# Patient Record
Sex: MALE | Race: BLACK OR AFRICAN AMERICAN | NOT HISPANIC OR LATINO | Employment: STUDENT | URBAN - METROPOLITAN AREA
[De-identification: names, ages, dates, MRNs, and addresses within clinical notes are randomized per-mention and may not be internally consistent; named-entity substitution may affect disease eponyms.]

---
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
## 2022-06-24 ENCOUNTER — APPOINTMENT (OUTPATIENT)
Dept: LAB | Facility: AMBULARY SURGERY CENTER | Age: 22
End: 2022-06-24

## 2022-06-24 DIAGNOSIS — Z11.1 SCREENING EXAMINATION FOR PULMONARY TUBERCULOSIS: ICD-10-CM

## 2022-06-24 PROCEDURE — 36415 COLL VENOUS BLD VENIPUNCTURE: CPT

## 2022-06-24 PROCEDURE — 86480 TB TEST CELL IMMUN MEASURE: CPT

## 2022-06-26 LAB
GAMMA INTERFERON BACKGROUND BLD IA-ACNC: 0.03 IU/ML
M TB IFN-G BLD-IMP: NEGATIVE
M TB IFN-G CD4+ BCKGRND COR BLD-ACNC: -0.01 IU/ML
M TB IFN-G CD4+ BCKGRND COR BLD-ACNC: 0.02 IU/ML
MITOGEN IGNF BCKGRD COR BLD-ACNC: 9.49 IU/ML

## 2022-11-21 ENCOUNTER — EVALUATION (OUTPATIENT)
Dept: PHYSICAL THERAPY | Facility: OTHER | Age: 22
End: 2022-11-21

## 2022-11-21 DIAGNOSIS — S86.012D RUPTURE OF LEFT ACHILLES TENDON, SUBSEQUENT ENCOUNTER: Primary | ICD-10-CM

## 2022-11-21 NOTE — PROGRESS NOTES
PT Evaluation     Today's date: 2022  Patient name: Alexander Mcneil  : 2000  MRN: [id number]  Referring provider: No ref  provider found  Dx: No diagnosis found  Assessment  Assessment details: Alexander Mcneil is a pleasant 23 y o  male who presents with L achllies repair 10/26/22 he was injuired playing basketball  He is 50% WB today in CAM boot  He removed his wedge  PT was attended at school for several visits  Today progressing several hip and knee exercises  Plan to see clint when over break this winter  insion is healing well and pain is controlled well  HEP issued and POC reviewed  Impairments: abnormal coordination, abnormal muscle firing, abnormal or restricted ROM, activity intolerance, impaired physical strength, lacks appropriate home exercise program, pain with function and poor body mechanics    Symptom irritability: moderateUnderstanding of Dx/Px/POC: good   Prognosis: good    Goals  Short Term:  Pt will report decreased levels of pain by at least 2 subjective ratings in 4 weeks  Pt will demonstrate improved ROM by at least 10 degrees in 4 weeks  Pt will demonstrate improved strength by 1/2 grade  Long Term:   Pt will be independent in their HEP in 8 weeks  Pt will be be pain free with IADL's  Pt will demonstrate improved FOTO, > 80         Plan  Plan details: Prognosis above is given PT services 2x/week tapering to 1x/week over the next 3 months and home program adherence    Patient would benefit from: skilled physical therapy  Planned modality interventions: thermotherapy: hydrocollator packs  Planned therapy interventions: activity modification, joint mobilization, manual therapy, motor coordination training, neuromuscular re-education, patient education, self care, therapeutic activities, therapeutic exercise, graded activity and home exercise program  Frequency: 2x week  Treatment plan discussed with: patient        Subjective Evaluation    Pain  At best pain ratin  At worst pain ratin    Patient Goals  Patient goals for therapy: decreased pain, independence with ADLs/IADLs, return to sport/leisure activities, increased motion and increased strength          Objective     General Comments: Ankle/Foot Comments   PF 45 in 15 evr 5     MMT not tested/       difficulty with intric mms activation of the foot  Precautions:       Manuals             L anklePF  /inv/evr stretching no DF                                                     Neuro Re-Ed             Toe curls                Weight shifts              minisqaut             Leg press              Clam shell              bridges                           Ther Ex             Bike              WS                                                                                            Ther Activity                                       Gait Training                                       Modalities

## 2022-11-23 ENCOUNTER — OFFICE VISIT (OUTPATIENT)
Dept: PHYSICAL THERAPY | Facility: OTHER | Age: 22
End: 2022-11-23

## 2022-11-23 DIAGNOSIS — S86.012D RUPTURE OF LEFT ACHILLES TENDON, SUBSEQUENT ENCOUNTER: Primary | ICD-10-CM

## 2022-11-25 ENCOUNTER — OFFICE VISIT (OUTPATIENT)
Dept: PHYSICAL THERAPY | Facility: OTHER | Age: 22
End: 2022-11-25

## 2022-11-25 DIAGNOSIS — S86.012D RUPTURE OF LEFT ACHILLES TENDON, SUBSEQUENT ENCOUNTER: Primary | ICD-10-CM

## 2022-11-25 NOTE — PROGRESS NOTES
Daily Note     Today's date: 2022  Patient name: Xenia Adler  : 2000  MRN: [id number]  Referring provider: Thuy Cornejo  Dx:   Encounter Diagnosis     ICD-10-CM    1  Rupture of left Achilles tendon, subsequent encounter  S86 012D                      Subjective: reviewed the next phase fo the protocol     Objective: See treatment diary below      Assessment: Tolerated treatment well  Patient demonstrated fatigue post treatment      Plan: Continue per plan of care  Precautions:       Manuals             L anklePF  /inv/evr stretching no DF  MJ                                                    Neuro Re-Ed             Toe curls    30x             Weight shifts  2min             minisqaut 10x3 boot on                                                                 Leg press  #70 boot on             Clam shell  rtb             bridges  15x2                          Ther Ex             Bike  5min             WS              DF ROM  5''x10             Ankle pumps              Wobble board  20 each             Heel slide sitting                                        Ther Activity                                       Gait Training                                       Modalities

## 2022-11-25 NOTE — PROGRESS NOTES
Daily Note     Today's date: 2022  Patient name: Yuniel Tinajero  : 2000  MRN: [id number]  Referring provider: Peng Cartagena  Dx:   Encounter Diagnosis     ICD-10-CM    1  Rupture of left Achilles tendon, subsequent encounter  S86 012D                      Subjective: reviewed the next phase fo the protocol     Objective: See treatment diary below      Assessment: Tolerated treatment well  Patient demonstrated fatigue post treatment      Plan: Continue per plan of care  Precautions:       Manuals             L anklePF  /inv/evr stretching no DF  MJ                                                    Neuro Re-Ed             Toe curls    30x             Weight shifts  2min  2MIN            minisqaut 10x3 boot on  10X3                                                               Leg press  #70 boot on             Clam shell  rtb             bridges  15x2  15x2            LAQ   gtb 15x2            Ther Ex             Bike  5min  5MIN            WS              DF ROM  5''x10  5''X10            Ankle pumps              Wobble board  20 each  20EA            Heel slide sitting                                        Ther Activity                                       Gait Training                                       Modalities

## 2022-12-29 ENCOUNTER — APPOINTMENT (OUTPATIENT)
Dept: PHYSICAL THERAPY | Facility: OTHER | Age: 22
End: 2022-12-29

## 2022-12-30 ENCOUNTER — EVALUATION (OUTPATIENT)
Dept: PHYSICAL THERAPY | Facility: OTHER | Age: 22
End: 2022-12-30

## 2022-12-30 DIAGNOSIS — S86.012D RUPTURE OF LEFT ACHILLES TENDON, SUBSEQUENT ENCOUNTER: Primary | ICD-10-CM

## 2022-12-30 NOTE — PROGRESS NOTES
Daily Note     Today's date: 2022  Patient name: Frederick Casarez  : 2000  MRN: [id number]  Referring provider: Gopal Preciado  Dx:   No diagnosis found  Subjective: reviewed the next phase fo the protocol increased swelling since he got out of the boot about 1 5 weeks ago  He is not using the heel lift  Mild pain with light IADL;s      Objective: See treatment diary below      Assessment: Tolerated treatment well  Patient demonstrated fatigue post treatment      Plan: Continue per plan of care  Precautions:       Manuals  12 30           All planes MJ  MJ                                                  Neuro Re-Ed             Toe curls    30x             Weight shifts  2min  2MIN            minisqaut 10x3 boot on  10X3           Rocker board  Ml 20ea            Marching              Step ups   6in            HR leg press  #50 10x3            biodex balance              Leg press  #70 boot on  #90            Clam shell  rtb             bridges  15x2  15x2            LAQ   gtb 15x2            Ther Ex             Bike  5min  5MIN            WS              DF ROM  5''x10  5''X10  Towel            Ankle pumps              Wobble board  20 each  20EA            Heel slide sitting   NV                                      Ther Activity                                       Gait Training                                       Modalities

## 2023-01-03 ENCOUNTER — OFFICE VISIT (OUTPATIENT)
Dept: PHYSICAL THERAPY | Facility: OTHER | Age: 23
End: 2023-01-03

## 2023-01-03 DIAGNOSIS — S86.012D RUPTURE OF LEFT ACHILLES TENDON, SUBSEQUENT ENCOUNTER: Primary | ICD-10-CM

## 2023-01-03 NOTE — PROGRESS NOTES
Daily Note     Today's date: 1/3/2023  Patient name: Radha Adams  : 2000  MRN: [id number]  Referring provider: Robert Chin  Dx:   Encounter Diagnosis     ICD-10-CM    1  Rupture of left Achilles tendon, subsequent encounter  S86 012D           Start Time: 1445  Stop Time: 1545  Total time in clinic (min): 60 minutes    Subjective: less swellng and pain not quite ready for ISTM secondary to small scab on the incision  Objective: See treatment diary below      Assessment: Tolerated treatment well  Patient demonstrated fatigue post treatment      Plan: Continue per plan of care  Precautions:       Manuals  12 30 1 3          All planes MJ  MJ MJ                                                 Neuro Re-Ed             Toe curls    30x             Weight shifts  2min  2MIN  2min           minisqaut 10x3 boot on  10X3 10x3           Rocker board  Ml 20ea  20 each           Marching    Foam           Step ups   6in  6in 20           HR leg press  #50 10x3  #60 10x3           biodex balance              Leg press  #70 boot on  #90  #90 10x3           Clam shell  rtb             bridges  15x2  15x2            LAQ   gtb 15x2  gtb 15x2           Ther Ex             Bike  5min  5MIN  5min           WS              DF ROM  5''x10  5''X10  Towel  10''x10           Ankle pumps              Wobble board  20 each  20EA  20 each           Heel slide sitting   NV                                      Ther Activity                                       Gait Training                                       Modalities

## 2023-01-05 ENCOUNTER — OFFICE VISIT (OUTPATIENT)
Dept: PHYSICAL THERAPY | Facility: OTHER | Age: 23
End: 2023-01-05

## 2023-01-05 DIAGNOSIS — S86.012D RUPTURE OF LEFT ACHILLES TENDON, SUBSEQUENT ENCOUNTER: Primary | ICD-10-CM

## 2023-01-05 NOTE — PROGRESS NOTES
Daily Note     Today's date: 2023  Patient name: Roxanne Sands  : 2000  MRN: [id number]  Referring provider: Thao Cornelius  Dx: No diagnosis found  Subjective: patient is progressing well tolerated today's session well  Objective: See treatment diary below      Assessment: Tolerated treatment well  Patient demonstrated fatigue post treatment      Plan: Continue per plan of care        Precautions:       Manuals  12 30 1 3 1 5 22         All planes MJ  MJ MJ MJ                                                 Neuro Re-Ed             Retro step kieser   #10 10x  #15 10x                      minisqaut 10x3 boot on  10X3 10x3  10x3         Rocker board  Ml 20ea  20 each  20each         slider   3 way 5x  3 way 5x            Marching    Foam  20ea          Step ups   6in  6in 20  6n 20x          HR leg press  #50 10x3  #60 10x3  #65 10x3          biodex balance     lv10 3x          Leg press  #70 boot on  #90  #90 10x3  #100 10x3                       Er into wall              LAQ   gtb 15x2  gtb 15x2           Ther Ex             Bike  5min  5MIN  5min  8min          WS              DF ROM  5''x10  5''X10  Towel  10''x10  10''x10          Rocker board     20eax2 ball toss          Wobble board  20 each  20EA  20 each  20 each          Heel slide sitting   NV   10''x10                                    Ther Activity                                       Gait Training                                       Modalities

## 2023-06-21 ENCOUNTER — EVALUATION (OUTPATIENT)
Dept: PHYSICAL THERAPY | Facility: OTHER | Age: 23
End: 2023-06-21
Payer: COMMERCIAL

## 2023-06-21 DIAGNOSIS — S86.012D RUPTURE OF LEFT ACHILLES TENDON, SUBSEQUENT ENCOUNTER: Primary | ICD-10-CM

## 2023-06-21 PROCEDURE — 97110 THERAPEUTIC EXERCISES: CPT

## 2023-06-21 PROCEDURE — 97140 MANUAL THERAPY 1/> REGIONS: CPT

## 2023-06-21 PROCEDURE — 97530 THERAPEUTIC ACTIVITIES: CPT

## 2023-06-21 NOTE — LETTER
2023    [address] [facility] [address]    Patient: Jamie Galvan   YOB: 2000   Date of Visit: 2023     Encounter Diagnosis     ICD-10-CM    1  Rupture of left Achilles tendon, subsequent encounter  S86 012D           Dear Dr Marah Long: Thank you for your recent referral of Jamie Galvan  Please review the attached evaluation summary from Ryan's recent visit  Please verify that you agree with the plan of care by signing the attached order  If you have any questions or concerns, please do not hesitate to call  I sincerely appreciate the opportunity to share in the care of one of your patients and hope to have another opportunity to work with you in the near future  Sincerely,    Marylee Shook, PT      Referring Provider:      I certify that I have read the below Plan of Care and certify the need for these services furnished under this plan of treatment while under my care  [facility] [address] [facility] [address]  Via Fax: [phone]          PT Re-Assessment    Today's date: 2023  Patient name: Jamie Galvan  : 2000  MRN: [id number]  Referring provider: Ludwig Rousseau  Dx:   Encounter Diagnosis     ICD-10-CM    1  Rupture of left Achilles tendon, subsequent encounter  S86 012D           Start Time: 7934  Stop Time: 1630  Total time in clinic (min): 45 minutes    Assessment  Assessment details: Re-Assessment 23: Jamie Galvan is a 23 y o  male presenting to outpatient physical therapy on 23 for aftercare following L achilles repair on 10/26/22  Upon evaluation, Kareen Kwan demonstrates impaired ROM/mobility, strength, power, endurance, and running intolerance  The listed impairments and functional limitation are effecting Ryan's ability to function at prior level   They can continue to benefit from physical therapy services at this times in order to address the above discussed impairments and functional limitation in order to allow for a return to premorbid status     @ IE: Roberto Carvajal is a pleasant 23 y o  male who presents with L achllies repair 10/26/22 he was injuired playing basketball  He is 50% WB today in CAM boot  He removed his wedge  PT was attended at school for several visits  Today progressing several hip and knee exercises  Plan to see clint when over break this winter  insion is healing well and pain is controlled well  HEP issued and POC reviewed  Impairments: abnormal coordination, abnormal muscle firing, abnormal muscle tone, abnormal or restricted ROM, activity intolerance, impaired physical strength, lacks appropriate home exercise program, pain with function and poor body mechanics    Symptom irritability: moderateUnderstanding of Dx/Px/POC: good   Prognosis: good    Goals  Short Term Goals:   1  Patient will be Independent with HEP  2  Patient will improve pain with activity by 50%  3  Patient will demonstrate 4/5 MMT or better PF with SL heel raise test     Long Term Goals:   1  Patient will improve pain with activity to 2/10 or less  2  Patient will continue with HEP independence to allow for decreased future reoccurrence of pain and loss in function  3  Patient will return to full running/sport participation without pain or limitation  Plan  Plan details: Prognosis above is given PT services 1-2x/week over the next 2-3 months and home program adherence    Patient would benefit from: skilled physical therapy  Referral necessary: No  Planned modality interventions: thermotherapy: hydrocollator packs and low level laser therapy  Other planned modality interventions: EPAT  Planned therapy interventions: activity modification, joint mobilization, manual therapy, motor coordination training, neuromuscular re-education, patient education, self care, therapeutic activities, therapeutic exercise, graded activity, home exercise program, "strengthening, stretching, flexibility, functional ROM exercises, graded exercise, balance/weight bearing training and balance  Frequency: 1-2x/week  Duration in weeks: 12  Treatment plan discussed with: patient        Subjective Evaluation    History of Present Illness  Date of surgery: 10/26/2022  Mechanism of injury: surgery  Mechanism of injury: Patient reports having lingering pain/tightness with daily activities and higher level activities such as running, jumping, and lifting  Patient reports feeling motivated to get back to his previous level of function  Pain  At best pain ratin  At worst pain ratin  Location: L Achilles/Posterior Tibialis  Quality: dull ache and tight  Aggravating factors: lifting and running    Social Support    Employment status: working ([id number]  [employer] Student)  Exercise history: Basketball    Treatments  Previous treatment: immobilization  Patient Goals  Patient goals for therapy: decreased pain, independence with ADLs/IADLs, return to sport/leisure activities, increased motion and increased strength  Patient goal: \"Get back to running and jumping without pain  \"        Objective     Posture: Mild pronation B/L feet, extensive scarring L achilles  Palpation: TTP L achilles/posterior tibialis  Myotomes (L/R): Intact B/L LE         Squat assess: Fair hip hinge \"L ankle/achilles stiff\"  Single leg Squat: Fair control B/L - very mild valgus collapse  Singe Leg Stance: Good B/L         MMT         AROM          PROM    Hip       L      R          L        R         L       R   ER          G  Max 3+ 3+       G  Med 3+ 3+       Iliop  4+ 4+         Knee         Extension 5 5       Flexion 5 5                Ankle         Dorsi Flexion 5 5 15 18     Plantar Flexion (SL HR test) 3  4       Inversion         Eversion                    Segmental mobility:   TCJ: Hypomobile L        Precautions: Standard      Manuals 23        Ankle PROM         Soft Tissue " "Deformation         EPAT         MFDc         TCJ Mobs                  Assessment GJL        Neuro Re-Ed         SLS Foam ball tosses 2x20        Bosu March         Sports Cord         3-way Slider         Blaze pod balance challenge NV                                            Ther Ex         Bike         TM/Curve         Side Stepping         Gastroc/Soleus Stretching 10x10\" slant board        Leg Press         [facility] MWM                                    Ther Activity         Anti-Gravity Run/Jog XXL Short 30% WB  (1' jog 30\" walk) 3 rounds        Squatting         Step Ups         Step Aly Kessler         Gait Training                           Modalities                                                 "

## 2023-06-21 NOTE — PROGRESS NOTES
PT Re-Assessment    Today's date: 2023  Patient name: Scarlett Junior  : 2000  MRN: [id number]  Referring provider: Dayami Angeles  Dx:   Encounter Diagnosis     ICD-10-CM    1  Rupture of left Achilles tendon, subsequent encounter  S86 012D           Start Time: 4703  Stop Time: 1630  Total time in clinic (min): 45 minutes    Assessment  Assessment details: Re-Assessment 23: Scarlett Junior is a 23 y o  male presenting to outpatient physical therapy on 23 for aftercare following L achilles repair on 10/26/22  Upon evaluation, Adolfo Palomo demonstrates impaired ROM/mobility, strength, power, endurance, and running intolerance  The listed impairments and functional limitation are effecting Ryan's ability to function at prior level  They can continue to benefit from physical therapy services at this times in order to address the above discussed impairments and functional limitation in order to allow for a return to premorbid status     @ IE: Scarlett Junior is a pleasant 23 y o  male who presents with L achllies repair 10/26/22 he was injuired playing basketball  He is 50% WB today in CAM boot  He removed his wedge  PT was attended at school for several visits  Today progressing several hip and knee exercises  Plan to see clint when over break this winter  insion is healing well and pain is controlled well  HEP issued and POC reviewed  Impairments: abnormal coordination, abnormal muscle firing, abnormal muscle tone, abnormal or restricted ROM, activity intolerance, impaired physical strength, lacks appropriate home exercise program, pain with function and poor body mechanics    Symptom irritability: moderateUnderstanding of Dx/Px/POC: good   Prognosis: good    Goals  Short Term Goals:   1  Patient will be Independent with HEP  2  Patient will improve pain with activity by 50%  3  Patient will demonstrate 4/5 MMT or better PF with SL heel raise test     Long Term Goals:   1   Patient will "improve pain with activity to 2/10 or less  2  Patient will continue with HEP independence to allow for decreased future reoccurrence of pain and loss in function  3  Patient will return to full running/sport participation without pain or limitation  Plan  Plan details: Prognosis above is given PT services 1-2x/week over the next 2-3 months and home program adherence  Patient would benefit from: skilled physical therapy  Referral necessary: No  Planned modality interventions: thermotherapy: hydrocollator packs and low level laser therapy  Other planned modality interventions: EPAT  Planned therapy interventions: activity modification, joint mobilization, manual therapy, motor coordination training, neuromuscular re-education, patient education, self care, therapeutic activities, therapeutic exercise, graded activity, home exercise program, strengthening, stretching, flexibility, functional ROM exercises, graded exercise, balance/weight bearing training and balance  Frequency: 1-2x/week  Duration in weeks: 12  Treatment plan discussed with: patient        Subjective Evaluation    History of Present Illness  Date of surgery: 10/26/2022  Mechanism of injury: surgery  Mechanism of injury: Patient reports having lingering pain/tightness with daily activities and higher level activities such as running, jumping, and lifting  Patient reports feeling motivated to get back to his previous level of function  Pain  At best pain ratin  At worst pain ratin  Location: L Achilles/Posterior Tibialis  Quality: dull ache and tight  Aggravating factors: lifting and running    Social Support    Employment status: working ([id number]   [employer])  Exercise history: Basketball    Treatments  Previous treatment: immobilization  Patient Goals  Patient goals for therapy: decreased pain, independence with ADLs/IADLs, return to sport/leisure activities, increased motion and increased strength  Patient goal: \"Get " "back to running and jumping without pain  \"        Objective     Posture: Mild pronation B/L feet, extensive scarring L achilles  Palpation: TTP L achilles/posterior tibialis  Myotomes (L/R): Intact B/L LE         Squat assess: Fair hip hinge \"L ankle/achilles stiff\"  Single leg Squat: Fair control B/L - very mild valgus collapse  Singe Leg Stance: Good B/L         MMT         AROM          PROM    Hip       L      R          L        R         L       R   ER          G  Max 3+ 3+       G  Med 3+ 3+       Iliop  4+ 4+         Knee         Extension 5 5       Flexion 5 5                Ankle         Dorsi Flexion 5 5 15 18     Plantar Flexion (SL HR test) 3  4       Inversion         Eversion                    Segmental mobility:   TCJ: Hypomobile L         Precautions: Standard      Manuals 6/21/23        Ankle PROM         Soft Tissue Deformation         EPAT         MFDc         TCJ Mobs                  Assessment GJL        Neuro Re-Ed         SLS Foam ball tosses 2x20        Bosu March         Sports Cord         3-way Slider         Blaze pod balance challenge NV                                            Ther Ex         Bike         TM/Curve         Side Stepping         Gastroc/Soleus Stretching 10x10\" slant board        Leg Press         TCJ MWM                                    Ther Activity         Anti-Gravity Run/Jog XXL Short 30% WB  (1' jog 30\" walk) 3 rounds        Squatting         Step Ups         Step [employer]         Gait Training                           Modalities                                  "

## 2023-06-28 ENCOUNTER — OFFICE VISIT (OUTPATIENT)
Dept: PHYSICAL THERAPY | Facility: OTHER | Age: 23
End: 2023-06-28
Payer: COMMERCIAL

## 2023-06-28 DIAGNOSIS — S86.012D RUPTURE OF LEFT ACHILLES TENDON, SUBSEQUENT ENCOUNTER: Primary | ICD-10-CM

## 2023-06-28 PROCEDURE — 97530 THERAPEUTIC ACTIVITIES: CPT

## 2023-06-28 PROCEDURE — 97110 THERAPEUTIC EXERCISES: CPT

## 2023-06-28 PROCEDURE — 97140 MANUAL THERAPY 1/> REGIONS: CPT

## 2023-06-28 PROCEDURE — 97112 NEUROMUSCULAR REEDUCATION: CPT

## 2023-06-28 NOTE — PROGRESS NOTES
"Daily Note     Today's date: 2023  Patient name: Ovidio Richmond  : 2000  MRN: [id number]  Referring provider: Johanna Patel  Dx:   Encounter Diagnosis     ICD-10-CM    1  Rupture of left Achilles tendon, subsequent encounter  S86 012D           Start Time: 1342  Stop Time: 1432  Total time in clinic (min): 50 minutes  GJL PT 1 on 1 from [id number] (46 minutes total)    Subjective: Patient still reports feeling stiff in the left achilles, but was not in any pain/soreness after anti-gravity jogging last visit  Reports feeling like he can run at less body weight support in anti-gravity this visit  Objective: See treatment diary below    Assessment: Tolerated treatment well with continued focus on left ankle mobility, anti-gravity jogging progressions, and hopping progressions  Patient exhibited good technique with therapeutic exercises and would benefit from continued PT  Plan: Continue per plan of care  Precautions: Standard      Manuals 23       Ankle PROM         Soft Tissue Deformation  GJL IASTM L Achilles       EPAT  GJL L Achilles 1 5Bar 21Hz 2000 pulses       MFDc         TCJ Mobs  GJL   Gr III-IV                Assessment GJL        Neuro Re-Ed         SLS Foam ball tosses 2x20        Bosu March         Sports Cord         3-way Slider  3x5 ea       Blaze pod balance challenge NV 2 rounds ea (L: 28, 33 R: 25, 25)       Jumping  2-1 foam 8x  SL L 2x6    2-1 6\" 8x  SL L 2x6                                  Ther Ex         Bike         TM/Curve         Side Stepping         Gastroc/Soleus Stretching 10x10\" slant board 4x30\" slant board       Leg Press         TCJ MWM                                    Ther Activity         Anti-Gravity Run/Jog XXL Short 30% WB  (1' jog 30\" walk) 3 rounds XXL Short 40-50% WB 2' walk, 5' jog, 2' walk       Squatting         Step Ups         Step Downs         [employer]  8\" SL L 10x, 15# KB 10x, 20# KB 10x       Gait Training                         " Modalities

## 2023-06-29 ENCOUNTER — OFFICE VISIT (OUTPATIENT)
Dept: PHYSICAL THERAPY | Facility: OTHER | Age: 23
End: 2023-06-29
Payer: COMMERCIAL

## 2023-06-29 DIAGNOSIS — S86.012D RUPTURE OF LEFT ACHILLES TENDON, SUBSEQUENT ENCOUNTER: Primary | ICD-10-CM

## 2023-06-29 PROCEDURE — 97530 THERAPEUTIC ACTIVITIES: CPT

## 2023-06-29 PROCEDURE — 97112 NEUROMUSCULAR REEDUCATION: CPT

## 2023-06-29 PROCEDURE — 97140 MANUAL THERAPY 1/> REGIONS: CPT

## 2023-06-29 NOTE — PROGRESS NOTES
"Daily Note     Today's date: 2023  Patient name: Jamie Galvan  : 2000  MRN: [id number]  Referring provider: Ludwig Rousseau  Dx:   Encounter Diagnosis     ICD-10-CM    1  Rupture of left Achilles tendon, subsequent encounter  S86 012D           Start Time: 1650  Stop Time: 1800  Total time in clinic (min): 70 minutes  [facility] PT 1 on 1 from 6135-3709 (47 minutes total)    Subjective: Patient reports feeling good no pain or complaints  Objective: See treatment diary below    Assessment: Tolerated treatment well with continued anti-gravity running and hopping progressions  Patient exhibited good technique with therapeutic exercises and would benefit from continued PT  Plan: Continue per plan of care  Precautions: Standard      Manuals 23      Ankle PROM         Soft Tissue Deformation  GJL IASTM L Achilles GJL IASTM L Achilles      EPAT  GJL L Achilles 1 5Bar 21Hz 2000 pulses GJL L Achilles 2 5Bar 21Hz 2000 pulses      MFDc         TCJ Mobs  GJL   Gr III-IV GJL   Gr III-IV               Assessment GJL        Neuro Re-Ed         SLS Foam ball tosses 2x20        Bosu March         Sports Cord         3-way Slider  3x5 ea 3x5 ea      Blaze pod balance challenge NV 2 rounds ea (L: 28, 33 R: 25, 25) 2 rounds ea (L: max 39 R: max 29)      Jumping  2-1 foam 8x  SL L 2x6    2-1 6\" 8x  SL L 2x6 2-1 6\" 8x  SL L 3x4    12\" 2-1 4x  SL L 2x4    DL 18\" 4x4      Jump Stick Landing onto Abdon Rosales   3x5 ea                        Ther Ex         Bike         TM/Curve         Side Stepping         Gastroc/Soleus Stretching 10x10\" slant board 4x30\" slant board 4x30\" slant board      Leg Press         TCJ MWM   10x10\"                                 Ther Activity         Anti-Gravity Run/Jog XXL Short 30% WB  (1' jog 30\" walk) 3 rounds XXL Short 40-50% WB 2' walk, 5' jog, 2' walk XXL Short 50-55% WB 2' walk, 5' jog, 2' walk      Squatting         Step Ups         Step Downs         LSU  8\" SL L 10x, " "15# KB 10x, 20# KB 10x 8\" SL L 10x, 20# KB 2x10      Gait Training                           Modalities                                    "

## 2023-07-05 ENCOUNTER — OFFICE VISIT (OUTPATIENT)
Dept: PHYSICAL THERAPY | Facility: OTHER | Age: 23
End: 2023-07-05
Payer: COMMERCIAL

## 2023-07-05 DIAGNOSIS — S86.012D RUPTURE OF LEFT ACHILLES TENDON, SUBSEQUENT ENCOUNTER: Primary | ICD-10-CM

## 2023-07-05 PROCEDURE — 97112 NEUROMUSCULAR REEDUCATION: CPT

## 2023-07-05 PROCEDURE — 97110 THERAPEUTIC EXERCISES: CPT

## 2023-07-05 PROCEDURE — 97530 THERAPEUTIC ACTIVITIES: CPT

## 2023-07-05 NOTE — PROGRESS NOTES
Daily Note     Today's date: 2023  Patient name: Latoya Patiño  : 2000  MRN: [id number]  Referring provider: Jody Lorenzo  Dx:   Encounter Diagnosis     ICD-10-CM    1. Rupture of left Achilles tendon, subsequent encounter  S86.012D           Start Time: 1710  Stop Time: 1805  Total time in clinic (min): 55 minutes  [facility] PT 1 on 1 from 5592-0155 (24 minutes total)    Subjective: Patient reports he had minor flare up of his left shin/posterior tib following last visit that lasted for a day or two. Objective: See treatment diary below    Assessment: Modified today's visit due to recent flare up. Held plyometrics/jumping today. No pain with anti-gravity jogging modifications this visit. Will progress as tolerated and continually monitor response to treatment. Patient exhibited good technique with therapeutic exercises and would benefit from continued PT. Plan: Continue per plan of care. Precautions: Standard.     Manuals 23     Ankle PROM         Soft Tissue Deformation  GJL IASTM L Achilles GJL IASTM L Achilles      EPAT  GJL L Achilles 1.5Bar 21Hz 2000 pulses GJL L Achilles 2.5Bar 21Hz 2000 pulses      MFDc         TCJ Mobs  GJL   Gr III-IV GJL   Gr III-IV               Assessment GJL        Neuro Re-Ed         SLS Foam ball tosses 2x20        Bosu March         Sports Cord         3-way Slider  3x5 ea 3x5 ea 3x5 ea     Blaze pod balance challenge NV 2 rounds ea (L: 28, 33 R: 25, 25) 2 rounds ea (L: max 39 R: max 29) 2 rounds ea      Jumping  2-1 foam 8x  SL L 2x6    2-1 6" 8x  SL L 2x6 2-1 6" 8x  SL L 3x4    12" 2-1 4x  SL L 2x4    DL 18" 4x4 Held     Jump Stick Landing onto Abdon Schein   3x5 ea Held     Bosu lateral step overs    2x10              Ther Ex         Bike         TM/Curve         Side Stepping    GTB feet 4 laps     Gastroc/Soleus Stretching 10x10" slant board 4x30" slant board 4x30" slant board 4x30" slant board     Leg Press         TCJ MWM   10x10" HR    4x10 gastroc off biodex, 3x10 soleus off ground                       Ther Activity         Anti-Gravity Run/Jog XXL Short 30% WB  (1' jog 30" walk) 3 rounds XXL Short 40-50% WB 2' walk, 5' jog, 2' walk XXL Short 50-55% WB 2' walk, 5' jog, 2' walk XXL Short 50% WB 2' walk, 30" jog 30" walk 5 rounds, 2' walk     Squatting    TRX SL 10x ea     Step Ups         Step Downs         LSU  8" SL L 10x, 15# KB 10x, 20# KB 10x 8" SL L 10x, 20# KB 2x10      Gait Training                           Modalities

## 2023-07-06 ENCOUNTER — OFFICE VISIT (OUTPATIENT)
Dept: PHYSICAL THERAPY | Facility: OTHER | Age: 23
End: 2023-07-06
Payer: COMMERCIAL

## 2023-07-06 DIAGNOSIS — S86.012D RUPTURE OF LEFT ACHILLES TENDON, SUBSEQUENT ENCOUNTER: Primary | ICD-10-CM

## 2023-07-06 PROCEDURE — 97530 THERAPEUTIC ACTIVITIES: CPT

## 2023-07-06 PROCEDURE — 97112 NEUROMUSCULAR REEDUCATION: CPT

## 2023-07-06 PROCEDURE — 97140 MANUAL THERAPY 1/> REGIONS: CPT

## 2023-07-06 NOTE — PROGRESS NOTES
Daily Note     Today's date: 2023  Patient name: Shane Vogel  : 2000  MRN: [id number]  Referring provider: Tereso Point  Dx:   Encounter Diagnosis     ICD-10-CM    1. Rupture of left Achilles tendon, subsequent encounter  S86.012D           Start Time: 1710  Stop Time: 1810  Total time in clinic (min): 60 minutes    Subjective: Patient reports no pain following yesterday's visit. Objective: See treatment diary below    Patient 10 min late but accommodated. Patient unable to perform greater than 2 full L LE SL Heel Raises    Assessment: Focus of visit on L gastroc/soleus strengthening due to increased weakness. Patient consented to use of BFR for strengthening after discussion/education of risks/benefits/contraindications regarding use. Patient exhibited good technique with therapeutic exercises and would benefit from continued PT. Plan: Continue per plan of care. Precautions: Standard.     Manuals 23    Ankle PROM         Soft Tissue Deformation  GJL IASTM L Achilles GJL IASTM L Achilles      EPAT  GJL L Achilles 1.5Bar 21Hz 2000 pulses GJL L Achilles 2.5Bar 21Hz 2000 pulses  GJL L Achilles 2.0Bar 21Hz 2000 pulses    MFDc         TCJ Mobs  GJL   Gr III-IV GJL   Gr III-IV      Education     GJL    Assessment GJL        Neuro Re-Ed         SLS Foam ball tosses 2x20        Bosu March         Sports Cord         3-way Slider  3x5 ea 3x5 ea 3x5 ea     Blaze pod balance challenge NV 2 rounds ea (L: 28, 33 R: 25, 25) 2 rounds ea (L: max 39 R: max 29) 2 rounds ea      Jumping  2-1 foam 8x  SL L 2x6    2-1 6" 8x  SL L 2x6 2-1 6" 8x  SL L 3x4    12" 2-1 4x  SL L 2x4    DL 18" 4x4 Held     Jump Stick Landing onto Abdon Schein   3x5 ea Held     Bosu lateral step overs    2x10     Leg Press HR (Flat, seat 8)     BFR 30# SL L 15x no assist, 2x15 min-mod assist with R     BFR HR     off biodex DL 3x10    BFR Ankle DF/Inv     3x15 ea L                      Ther Ex Bike         TM/Curve         Side Stepping    GTB feet 4 laps     Gastroc/Soleus Stretching 10x10" slant board 4x30" slant board 4x30" slant board 4x30" slant board     TCJ MWM   10x10"      HR    4x10 gastroc off biodex, 3x10 soleus off ground                       Ther Activity         Anti-Gravity Run/Jog XXL Short 30% WB  (1' jog 30" walk) 3 rounds XXL Short 40-50% WB 2' walk, 5' jog, 2' walk XXL Short 50-55% WB 2' walk, 5' jog, 2' walk XXL Short 50% WB 2' walk, 30" jog 30" walk 5 rounds, 2' walk XXL Short 50% WB 2' walk, 1' jog 30" walk, 5 rounds, 2' walk    Squatting    TRX SL 10x ea     Step Ups         Step Downs         LSU  8" SL L 10x, 15# KB 10x, 20# KB 10x 8" SL L 10x, 20# KB 2x10      Gait Training                           Modalities

## 2023-07-12 ENCOUNTER — OFFICE VISIT (OUTPATIENT)
Dept: PHYSICAL THERAPY | Facility: OTHER | Age: 23
End: 2023-07-12
Payer: COMMERCIAL

## 2023-07-12 DIAGNOSIS — S86.012D RUPTURE OF LEFT ACHILLES TENDON, SUBSEQUENT ENCOUNTER: Primary | ICD-10-CM

## 2023-07-12 PROCEDURE — 97110 THERAPEUTIC EXERCISES: CPT

## 2023-07-12 PROCEDURE — 97140 MANUAL THERAPY 1/> REGIONS: CPT

## 2023-07-12 PROCEDURE — 97112 NEUROMUSCULAR REEDUCATION: CPT

## 2023-07-12 PROCEDURE — 97530 THERAPEUTIC ACTIVITIES: CPT

## 2023-07-12 NOTE — PROGRESS NOTES
Daily Note     Today's date: 2023  Patient name: Elias Sumner  : 2000  MRN: [id number]  Referring provider: Chano Beckham  Dx:   Encounter Diagnosis     ICD-10-CM    1. Rupture of left Achilles tendon, subsequent encounter  S86.012D           Start Time: 8134  Stop Time: 1838  Total time in clinic (min): 83 minutes  [facility] PT 1 on 1 from 3991-8692 (32 minutes total)    Subjective: Patient reports no new pain or complaints this visit. Objective: See treatment diary below    Mild TTP L Posterior Tibialis    Scar Measurements:  Length - 10cm  Widest point - 1.5cm    Assessment: Continued focus on L gastroc/soleus strengthening this visit with BFR, general hip strengthening, and ankle stability. Patient exhibited good technique with therapeutic exercises and would benefit from continued PT. Plan: Continue per plan of care. Precautions: Standard.     Manuals 23     Ankle PROM           Soft Tissue Deformation  GJL IASTM L Achilles GJL IASTM L Achilles        EPAT  GJL L Achilles 1.5Bar 21Hz 2000 pulses GJL L Achilles 2.5Bar 21Hz 2000 pulses  GJL L Achilles 2.0Bar 21Hz 2000 pulses GJL L Achilles 2.5Bar 21Hz 2000 pulses     MFDc           TCJ Mobs  GJL   Gr III-IV GJL   Gr III-IV        Education     GJL      Assessment GJL     GJL     Neuro Re-Ed           SLS Foam ball tosses 2x20          Bosu March      3x10 march step up pause     Sports Cord           3-way Slider  3x5 ea 3x5 ea 3x5 ea       Blaze pod balance challenge NV 2 rounds ea (L: 28, 33 R: 25, 25) 2 rounds ea (L: max 39 R: max 29) 2 rounds ea        Jumping  2-1 foam 8x  SL L 2x6    2-1 6" 8x  SL L 2x6 2-1 6" 8x  SL L 3x4    12" 2-1 4x  SL L 2x4    DL 18" 4x4 Held       Jump Stick Landing onto Abdon Schein   3x5 ea Held       Bosu lateral step overs    2x10  3x10     Leg Press HR (Flat, seat 8)     BFR 30# SL L 15x no assist, 2x15 min-mod assist with R  BFR 30# 4x10 SL L     BFR HR     off biodex DL 3x10 Off Biodex 2-1 4x10 L    Plum TB edge table 4x10 L     BFR Ankle DF/Inv     3x15 ea L DF plum TB 8e10e4-3" no BFR                           Ther Ex           Bike      6'     TM/Curve           Side Stepping    GTB feet 4 laps  GTB feet 5 laps     Gastroc/Soleus Stretching 10x10" slant board 4x30" slant board 4x30" slant board 4x30" slant board       TCJ MWM   10x10"        HR    4x10 gastroc off biodex, 3x10 soleus off ground                             Ther Activity           Anti-Gravity Run/Jog XXL Short 30% WB  (1' jog 30" walk) 3 rounds XXL Short 40-50% WB 2' walk, 5' jog, 2' walk XXL Short 50-55% WB 2' walk, 5' jog, 2' walk XXL Short 50% WB 2' walk, 30" jog 30" walk 5 rounds, 2' walk XXL Short 50% WB 2' walk, 1' jog 30" walk, 5 rounds, 2' walk XXL Short 50% WB 1' jog 30" walk, 5 rounds, 2' walk     Squatting    TRX SL 10x ea       Step Ups           Step Downs           LSU  8" SL L 10x, 15# KB 10x, 20# KB 10x 8" SL L 10x, 20# KB 2x10        Gait Training                                 Modalities

## 2023-07-13 ENCOUNTER — APPOINTMENT (OUTPATIENT)
Dept: PHYSICAL THERAPY | Facility: OTHER | Age: 23
End: 2023-07-13
Payer: COMMERCIAL

## 2023-07-13 DIAGNOSIS — S86.012D RUPTURE OF LEFT ACHILLES TENDON, SUBSEQUENT ENCOUNTER: Primary | ICD-10-CM

## 2023-07-13 NOTE — PROGRESS NOTES
Daily Note     Today's date: 2023  Patient name: Elias Sumner  : 2000  MRN: [id number]  Referring provider: Chano Beckham  Dx:   Encounter Diagnosis     ICD-10-CM    1. Rupture of left Achilles tendon, subsequent encounter  S86.012D                      Subjective:   LV: Patient reports no new pain or complaints this visit. Objective: See treatment diary below  LV:   Mild TTP L Posterior Tibialis    Scar Measurements:  Length - 10cm  Widest point - 1.5cm    Assessment:   LV: Continued focus on L gastroc/soleus strengthening this visit with BFR, general hip strengthening, and ankle stability. Patient exhibited good technique with therapeutic exercises and would benefit from continued PT. Plan: Continue per plan of care. Precautions: Standard.     Manuals 23    Ankle PROM           Soft Tissue Deformation  GJL IASTM L Achilles GJL IASTM L Achilles        EPAT  GJL L Achilles 1.5Bar 21Hz 2000 pulses GJL L Achilles 2.5Bar 21Hz 2000 pulses  GJL L Achilles 2.0Bar 21Hz 2000 pulses GJL L Achilles 2.5Bar 21Hz 2000 pulses     MFDc           TCJ Mobs  GJL   Gr III-IV GJL   Gr III-IV        Education     GJL      Assessment GJL     GJL     Neuro Re-Ed           SLS Foam ball tosses 2x20          Bosu March      3x10 march step up pause     Sports Cord           3-way Slider  3x5 ea 3x5 ea 3x5 ea       Blaze pod balance challenge NV 2 rounds ea (L: 28, 33 R: 25, 25) 2 rounds ea (L: max 39 R: max 29) 2 rounds ea        Jumping  2-1 foam 8x  SL L 2x6    2-1 6" 8x  SL L 2x6 2-1 6" 8x  SL L 3x4    12" 2-1 4x  SL L 2x4    DL 18" 4x4 Held       Jump Stick Landing onto Abdon Schein   3x5 ea Held       Bosu lateral step overs    2x10  3x10     Leg Press HR (Flat, seat 8)     BFR 30# SL L 15x no assist, 2x15 min-mod assist with R  BFR 30# 4x10 SL L     BFR HR     off biodex DL 3x10 Off Biodex 2-1 4x10 L    Plum TB edge table 4x10 L     BFR Ankle DF/Inv     3x15 ea L DF plum TB 6t31w5-6" no BFR                           Ther Ex           Bike      6'     TM/Curve           Side Stepping    GTB feet 4 laps  GTB feet 5 laps     Gastroc/Soleus Stretching 10x10" slant board 4x30" slant board 4x30" slant board 4x30" slant board       TCJ MWM   10x10"        HR    4x10 gastroc off biodex, 3x10 soleus off ground                             Ther Activity           Anti-Gravity Run/Jog XXL Short 30% WB  (1' jog 30" walk) 3 rounds XXL Short 40-50% WB 2' walk, 5' jog, 2' walk XXL Short 50-55% WB 2' walk, 5' jog, 2' walk XXL Short 50% WB 2' walk, 30" jog 30" walk 5 rounds, 2' walk XXL Short 50% WB 2' walk, 1' jog 30" walk, 5 rounds, 2' walk XXL Short 50% WB 1' jog 30" walk, 5 rounds, 2' walk     Squatting    TRX SL 10x ea       Step Ups           Step Downs           LSU  8" SL L 10x, 15# KB 10x, 20# KB 10x 8" SL L 10x, 20# KB 2x10        Gait Training                                 Modalities

## 2023-07-19 ENCOUNTER — OFFICE VISIT (OUTPATIENT)
Dept: PHYSICAL THERAPY | Facility: OTHER | Age: 23
End: 2023-07-19
Payer: COMMERCIAL

## 2023-07-19 DIAGNOSIS — S86.012D RUPTURE OF LEFT ACHILLES TENDON, SUBSEQUENT ENCOUNTER: Primary | ICD-10-CM

## 2023-07-19 PROCEDURE — 97140 MANUAL THERAPY 1/> REGIONS: CPT

## 2023-07-19 PROCEDURE — 97530 THERAPEUTIC ACTIVITIES: CPT

## 2023-07-19 PROCEDURE — 97112 NEUROMUSCULAR REEDUCATION: CPT

## 2023-07-19 NOTE — PROGRESS NOTES
Daily Note     Today's date: 2023  Patient name: Elias Sumner  : 2000  MRN: [id number]  Referring provider: Chano Beckham  Dx:   Encounter Diagnosis     ICD-10-CM    1. Rupture of left Achilles tendon, subsequent encounter  S86.012D           Start Time: 1800  Stop Time: 1855  Total time in clinic (min): 55 minutes    Subjective: Patient reports feeling good today and is eager to try some sprinting in the next month. Objective: See treatment diary below    Mild TTP L Posterior Tibialis    SL HR Test 7 reps L LE    Scar Measurements:  Length - 10cm  Widest point - 1.5cm    Assessment: Continued focus on L gastroc/soleus strengthening this visit with BFR. Patient single leg HR on L LE improved from 1 repetition on 23 to 7 repetitions today. Performed EPAT on posterior tibialis without any adverse reactions. Patient exhibited good technique with therapeutic exercises and would benefit from continued PT. Plan: Continue per plan of care. Precautions: Standard. Manuals 23    Ankle PROM           Soft Tissue Deformation  GJL IASTM L Achilles GJL IASTM L Achilles        EPAT  GJL L Achilles 1.5Bar 21Hz 2000 pulses GJL L Achilles 2.5Bar 21Hz 2000 pulses  GJL L Achilles 2.0Bar 21Hz 2000 pulses GJL L Achilles 2.5Bar 21Hz 2000 pulses GJL L Achilles 3.0Bar 21Hz 2000 pulses    L Posterior Tibialis 2. 0BAR 21Hz 2000 pulses    MFDc           TCJ Mobs  GJL   Gr III-IV GJL   Gr III-IV        Education     GJL      Assessment GJL     GJL GJL    Neuro Re-Ed           SLS Foam ball tosses 2x20          Bosu March      3x10 march step up pause     Sports Cord           3-way Slider  3x5 ea 3x5 ea 3x5 ea       Blaze pod balance challenge NV 2 rounds ea (L: 28, 33 R: 25, 25) 2 rounds ea (L: max 39 R: max 29) 2 rounds ea        Jumping  2-1 foam 8x  SL L 2x6    2-1 6" 8x  SL L 2x6 2-1 6" 8x  SL L 3x4    12" 2-1 4x  SL L 2x4    DL 18" 4x4 Held       Jump Stick Landing onto Abdon Rooneyin   3x5 ea Held       Bosu lateral step overs    2x10  3x10     Leg Press HR (Flat, seat 8)     BFR 30# SL L 15x no assist, 2x15 min-mod assist with R  BFR 30# 4x10 SL L BFR 35# 4x10 SL L self assist with R LE last 2 rounds    BFR HR     off biodex DL 3x10 Off Biodex 2-1 4x10 L    Plum TB edge table 4x10 L Off Biodex 2-1 4x10 L    Plum TB edge table 4x10 L    BFR Ankle DF/Inv     3x15 ea L DF plum TB 3a01p6-0" no BFR                           Ther Ex           Bike      6'     TM/Curve           Side Stepping    GTB feet 4 laps  GTB feet 5 laps     Gastroc/Soleus Stretching 10x10" slant board 4x30" slant board 4x30" slant board 4x30" slant board       TCJ MWM   10x10"        HR    4x10 gastroc off biodex, 3x10 soleus off ground                             Ther Activity           Anti-Gravity Run/Jog XXL Short 30% WB  (1' jog 30" walk) 3 rounds XXL Short 40-50% WB 2' walk, 5' jog, 2' walk XXL Short 50-55% WB 2' walk, 5' jog, 2' walk XXL Short 50% WB 2' walk, 30" jog 30" walk 5 rounds, 2' walk XXL Short 50% WB 2' walk, 1' jog 30" walk, 5 rounds, 2' walk XXL Short 50% WB 1' jog 30" walk, 5 rounds, 2' walk XXL Short 60% WB 1' jog 30" walk, 5 rounds, 2' walk    Squatting    TRX SL 10x ea       Step Ups           Step Downs           LSU  8" SL L 10x, 15# KB 10x, 20# KB 10x 8" SL L 10x, 20# KB 2x10        Gait Training                                 Modalities

## 2023-07-21 ENCOUNTER — OFFICE VISIT (OUTPATIENT)
Dept: PHYSICAL THERAPY | Facility: OTHER | Age: 23
End: 2023-07-21
Payer: COMMERCIAL

## 2023-07-21 DIAGNOSIS — S86.012D RUPTURE OF LEFT ACHILLES TENDON, SUBSEQUENT ENCOUNTER: Primary | ICD-10-CM

## 2023-07-21 PROCEDURE — 97140 MANUAL THERAPY 1/> REGIONS: CPT

## 2023-07-21 PROCEDURE — 97530 THERAPEUTIC ACTIVITIES: CPT

## 2023-07-21 NOTE — PROGRESS NOTES
Daily Note     Today's date: 2023  Patient name: Mack Siegel  : 2000  MRN: [id number]  Referring provider: Antelmo Hicks  Dx:   Encounter Diagnosis     ICD-10-CM    1. Rupture of left Achilles tendon, subsequent encounter  S86.012D           Start Time: 1108  Stop Time: 1206  Total time in clinic (min): 58 minutes  [facility] PT 1 on 1 from 7269-2724 and 7231-5053 (20 minutes total)    Subjective: Patient reports feeling a little sore today, but is otherwise doing well. Objective: See treatment diary below    Scar Measurements:  Length - 10cm  Widest point - 1.5cm    Assessment: Continued focus on L gastroc/soleus strengthening this visit with BFR and EPAT to L post tib/achilles. No adverse reactions. Plan to keep progressing in anti-gravity TM and once able to run at 80% BW with no pain, gradually progress to ground running and progressive plyometrics. Patient exhibited good technique with therapeutic exercises and would benefit from continued PT. Plan: Continue per plan of care. Precautions: Standard. Manuals 23   Ankle PROM           Soft Tissue Deformation  GJL IASTM L Achilles GJL IASTM L Achilles        EPAT  GJL L Achilles 1.5Bar 21Hz 2000 pulses GJL L Achilles 2.5Bar 21Hz 2000 pulses  GJL L Achilles 2.0Bar 21Hz 2000 pulses GJL L Achilles 2.5Bar 21Hz 2000 pulses GJL L Achilles 3.0Bar 21Hz 2000 pulses    L Posterior Tibialis 2. 0BAR 21Hz 2000 pulses GJL L Achilles 3.3Bar 21Hz 2000 pulses    L Posterior Tibialis 2. 5BAR 21Hz 2000 pulses   MFDc           TCJ Mobs  GJL   Gr III-IV GJL   Gr III-IV        Education     GJL      Assessment GJL     GJL GJL    Neuro Re-Ed           SLS Foam ball tosses 2x20          Bosu March      3x10 march step up pause     Sports Cord           3-way Slider  3x5 ea 3x5 ea 3x5 ea       Blaze pod balance challenge NV 2 rounds ea (L: 28, 33 R: 25, 25) 2 rounds ea (L: max 39 R: max 29) 2 rounds ea Jumping  2-1 foam 8x  SL L 2x6    2-1 6" 8x  SL L 2x6 2-1 6" 8x  SL L 3x4    12" 2-1 4x  SL L 2x4    DL 18" 4x4 Held       Jump Stick Landing onto Abdon Schein   3x5 ea Held       Bosu lateral step overs    2x10  3x10     Leg Press HR (Flat, seat 8)     BFR 30# SL L 15x no assist, 2x15 min-mod assist with R  BFR 30# 4x10 SL L BFR 35# 4x10 SL L self assist with R LE last 2 rounds BFR 35# 4x10 SL L self assist with R LE last 2 rounds   BFR HR     off biodex DL 3x10 Off Biodex 2-1 4x10 L    Plum TB edge table 4x10 L Off Biodex 2-1 4x10 L    Plum TB edge table 4x10 L Off Biodex 2-1 4x10 L    Plum TB edge table 4x10 L   BFR Ankle DF/Inv     3x15 ea L DF plum TB [id number]" no BFR                           Ther Ex           Bike      6'     TM/Curve           Side Stepping    GTB feet 4 laps  GTB feet 5 laps     Gastroc/Soleus Stretching 10x10" slant board 4x30" slant board 4x30" slant board 4x30" slant board       TCJ MWM   10x10"        HR    4x10 gastroc off biodex, 3x10 soleus off ground                             Ther Activity           Anti-Gravity Run/Jog XXL Short 30% WB  (1' jog 30" walk) 3 rounds XXL Short 40-50% WB 2' walk, 5' jog, 2' walk XXL Short 50-55% WB 2' walk, 5' jog, 2' walk XXL Short 50% WB 2' walk, 30" jog 30" walk 5 rounds, 2' walk XXL Short 50% WB 2' walk, 1' jog 30" walk, 5 rounds, 2' walk XXL Short 50% WB 1' jog 30" walk, 5 rounds, 2' walk XXL Short 60% WB 1' jog 30" walk, 5 rounds, 2' walk XXL Short 65-70% WB 1' jog 30" walk, 5 rounds, 2' walk   Squatting    TRX SL 10x ea       Step Ups           Step Downs           LSU  8" SL L 10x, 15# KB 10x, 20# KB 10x 8" SL L 10x, 20# KB 2x10        Gait Training                                 Modalities

## 2023-07-26 ENCOUNTER — OFFICE VISIT (OUTPATIENT)
Dept: PHYSICAL THERAPY | Facility: OTHER | Age: 23
End: 2023-07-26
Payer: COMMERCIAL

## 2023-07-26 DIAGNOSIS — S86.012D RUPTURE OF LEFT ACHILLES TENDON, SUBSEQUENT ENCOUNTER: Primary | ICD-10-CM

## 2023-07-26 PROCEDURE — 97140 MANUAL THERAPY 1/> REGIONS: CPT

## 2023-07-26 PROCEDURE — 97112 NEUROMUSCULAR REEDUCATION: CPT

## 2023-07-26 PROCEDURE — 97530 THERAPEUTIC ACTIVITIES: CPT

## 2023-07-26 NOTE — PROGRESS NOTES
Daily Note     Today's date: 2023  Patient name: Latoya Patiño  : 2000  MRN: [id number]  Referring provider: Jody Lorenzo  Dx:   Encounter Diagnosis     ICD-10-CM    1. Rupture of left Achilles tendon, subsequent encounter  S86.012D           Start Time: 1800  Stop Time: 1855  Total time in clinic (min): 55 minutes    Subjective: Patient reports feeling tight around the achilles today. Objective: See treatment diary below    Assessment: Continued focus on L gastroc/soleus strengthening this visit with BFR and EPAT to L post tib/achilles. No adverse reactions. Progress patient to progressive sprints at 60% BW in anti-gravity and re-introduction to plyometrics NV as long as patient does not have Patient exhibited good technique with therapeutic exercises and would benefit from continued PT. Plan: Continue per plan of care. Precautions: Standard. Manuals 23    Ankle PROM           Soft Tissue Deformation GJL IASTM L Achilles      GJL IASTM L Achilles/ Gastroc/ Soleus    EPAT GJL L Achilles 2.5Bar 21Hz 2000 pulses  GJL L Achilles 2.0Bar 21Hz 2000 pulses GJL L Achilles 2.5Bar 21Hz 2000 pulses GJL L Achilles 3.0Bar 21Hz 2000 pulses    L Posterior Tibialis 2. 0BAR 21Hz 2000 pulses GJL L Achilles 3.3Bar 21Hz 2000 pulses    L Posterior Tibialis 2. 5BAR 21Hz 2000 pulses GJL L Achilles 3.5Bar 21Hz 2000 pulses    L Posterior Tibialis 2. 5BAR 21Hz 2000 pulses    MFDc           TCJ Mobs GJL   Gr III-IV          Education   GJL        Assessment    GJL GJL      Neuro Re-Ed           SLS           Bosu March    3x10 march step up pause       Sports Cord           3-way Slider 3x5 ea 3x5 ea         Blaze pod balance challenge 2 rounds ea (L: max 39 R: max 29) 2 rounds ea          Jumping 2-1 6" 8x  SL L 3x4    12" 2-1 4x  SL L 2x4    DL 18" 4x4 Held         Jump Stick Landing onto Abdon Schein 3x5 ea Held         Bosu lateral step overs  2x10  3x10       Leg Press HR (Flat, seat 8)   BFR 30# SL L 15x no assist, 2x15 min-mod assist with R  BFR 30# 4x10 SL L BFR 35# 4x10 SL L self assist with R LE last 2 rounds BFR 35# 4x10 SL L self assist with R LE last 2 rounds BFR 35# 4x10 SL L self assist with R LE last 2 rounds    BFR HR   off biodex DL 3x10 Off Biodex 2-1 4x10 L    Plum TB edge table 4x10 L Off Biodex 2-1 4x10 L    Plum TB edge table 4x10 L Off Biodex 2-1 4x10 L    Plum TB edge table 4x10 L BFR 35# 4x10 SL L self assist with R LE last 2 rounds    BFR Ankle DF/Inv   3x15 ea L DF plum TB 3w16r8-8" no BFR                             Ther Ex           Bike    6'       TM/Curve           Side Stepping  GTB feet 4 laps  GTB feet 5 laps       Gastroc/Soleus Stretching 4x30" slant board 4x30" slant board         TCJ MWM 10x10"          HR  4x10 gastroc off biodex, 3x10 soleus off ground         Foam Rolling        L Calf 2' HEP               Ther Activity           Anti-Gravity Run/Jog XXL Short 50-55% WB 2' walk, 5' jog, 2' walk XXL Short 50% WB 2' walk, 30" jog 30" walk 5 rounds, 2' walk XXL Short 50% WB 2' walk, 1' jog 30" walk, 5 rounds, 2' walk XXL Short 50% WB 1' jog 30" walk, 5 rounds, 2' walk XXL Short 60% WB 1' jog 30" walk, 5 rounds, 2' walk XXL Short 65-70% WB 1' jog 30" walk, 5 rounds, 2' walk XXL Short 75-80% WB 1' jog 30" walk, 5 rounds, 2' walk    60% BW 8.0mph 30"x1 semi-sprint    Squatting  TRX SL 10x ea         Step Ups           Step Downs           LSU 8" SL L 10x, 20# KB 2x10          Gait Training                                 Modalities

## 2023-07-28 ENCOUNTER — OFFICE VISIT (OUTPATIENT)
Dept: PHYSICAL THERAPY | Facility: OTHER | Age: 23
End: 2023-07-28
Payer: COMMERCIAL

## 2023-07-28 DIAGNOSIS — S86.012D RUPTURE OF LEFT ACHILLES TENDON, SUBSEQUENT ENCOUNTER: Primary | ICD-10-CM

## 2023-07-28 PROCEDURE — 97110 THERAPEUTIC EXERCISES: CPT

## 2023-07-28 PROCEDURE — 97112 NEUROMUSCULAR REEDUCATION: CPT

## 2023-07-28 PROCEDURE — 97140 MANUAL THERAPY 1/> REGIONS: CPT

## 2023-07-28 PROCEDURE — 97530 THERAPEUTIC ACTIVITIES: CPT

## 2023-07-28 NOTE — PROGRESS NOTES
Daily Note     Today's date: 2023  Patient name: Jacqueline Quinteros  : 2000  MRN: [id number]  Referring provider: Florencia Da Silva  Dx:   Encounter Diagnosis     ICD-10-CM    1. Rupture of left Achilles tendon, subsequent encounter  S86.012D           Start Time: 820  Stop Time: 32  Total time in clinic (min): 72 minutes  [facility] PT 1 on 1 from 6822-1950 (34 minutes total)    Subjective: Patient reports he was a little sore in his calf/achilles after last visit, but is doing better today. Objective: See treatment diary below    Assessment: Continued focus on L gastroc/soleus strengthening this visit with BFR. Introduced patient to anti-gravity fast paced run/near sprints at 60% BW with no adverse reactions. Re-introduced patient to SL hopping this visit with no adverse reactions as well. Will assess patient response NV and continue to progress if no setback occurs. Patient exhibited good technique with therapeutic exercises and would benefit from continued PT. Plan: Continue per plan of care. Precautions: Standard. Manuals 23     Ankle PROM           Soft Tissue Deformation     GJL IASTM L Achilles/ Gastroc/ Soleus GJL IASTM L Achilles/ Gastroc/ Soleus     EPAT GJL L Achilles 2.0Bar 21Hz 2000 pulses GJL L Achilles 2.5Bar 21Hz 2000 pulses GJL L Achilles 3.0Bar 21Hz 2000 pulses    L Posterior Tibialis 2. 0BAR 21Hz 2000 pulses GJL L Achilles 3.3Bar 21Hz 2000 pulses    L Posterior Tibialis 2. 5BAR 21Hz 2000 pulses GJL L Achilles 3.5Bar 21Hz 2000 pulses    L Posterior Tibialis 2. 5BAR 21Hz 2000 pulses      MFDc      GJL L gastroc/ soleus     TCJ Mobs           Education GJL          Assessment  GJL GJL        Neuro Re-Ed           SLS           Bosu March  3x10 march step up pause         Sports Cord           3-way Slider           Blaze pod balance challenge           Jumping      SL L 4x5 in place    SL L onto foam 2x5 fwd, 2x5 lateral ea way     [place]ump [place] onto [employer] lateral step overs  3x10         Leg Press HR (Flat, seat 8) BFR 30# SL L 15x no assist, 2x15 min-mod assist with R  BFR 30# 4x10 SL L BFR 35# 4x10 SL L self assist with R LE last 2 rounds BFR 35# 4x10 SL L self assist with R LE last 2 rounds BFR 35# 4x10 SL L self assist with R LE last 2 rounds BFR 35# 4x10 SL L self assist with R LE last 2 rounds     BFR HR off biodex DL 3x10 Off Biodex 2-1 4x10 L    Plum TB edge table 4x10 L Off Biodex 2-1 4x10 L    Plum TB edge table 4x10 L Off Biodex 2-1 4x10 L    Plum TB edge table 4x10 L Off Biodex 2-1 4x10 L    Plum TB edge table 4x10 L Off Biodex 2-1 4x10 L    Plum TB edge table 4x10 L     BFR Ankle DF/Inv 3x15 ea L DF plum TB [id number]" no BFR                               Ther Ex           Bike  6'         TM/Curve           Side Stepping  GTB feet 5 laps         Gastroc/Soleus Stretching           TCJ MWM           HR           Foam Rolling      L Calf 2' HEP L Calf 3'                Ther Activity           Anti-Gravity Run/Jog XXL Short 50% WB 2' walk, 1' jog 30" walk, 5 rounds, 2' walk XXL Short 50% WB 1' jog 30" walk, 5 rounds, 2' walk XXL Short 60% WB 1' jog 30" walk, 5 rounds, 2' walk XXL Short 65-70% WB 1' jog 30" walk, 5 rounds, 2' walk XXL Short 75-80% WB 1' jog 30" walk, 5 rounds, 2' walk    60% BW 8.0mph 30"x1 semi-sprint XXL Short 60% WB 2' walk, 3' jog    4 rounds fast paced run/near sprint 15-20" on, 45" walk    2' c/d walk     Squatting           Step Ups           Step [employer]           Dynamic W/u      4'     Gait Training                                 Modalities

## 2023-08-01 ENCOUNTER — APPOINTMENT (OUTPATIENT)
Dept: PHYSICAL THERAPY | Facility: OTHER | Age: 23
End: 2023-08-01
Payer: COMMERCIAL

## 2023-08-01 ENCOUNTER — OFFICE VISIT (OUTPATIENT)
Dept: PHYSICAL THERAPY | Facility: OTHER | Age: 23
End: 2023-08-01
Payer: COMMERCIAL

## 2023-08-01 DIAGNOSIS — S86.012D RUPTURE OF LEFT ACHILLES TENDON, SUBSEQUENT ENCOUNTER: Primary | ICD-10-CM

## 2023-08-01 PROCEDURE — 97140 MANUAL THERAPY 1/> REGIONS: CPT

## 2023-08-01 PROCEDURE — 97110 THERAPEUTIC EXERCISES: CPT

## 2023-08-01 PROCEDURE — 97112 NEUROMUSCULAR REEDUCATION: CPT

## 2023-08-01 PROCEDURE — 97530 THERAPEUTIC ACTIVITIES: CPT

## 2023-08-01 NOTE — PROGRESS NOTES
Daily Note     Today's date: 2023  Patient name: Sarah Bah  : 2000  MRN: [id number]  Referring provider: Scooter Irby  Dx:   Encounter Diagnosis     ICD-10-CM    1. Rupture of left Achilles tendon, subsequent encounter  S86.012D                      Subjective:   LV: Patient reports he was a little sore in his calf/achilles after last visit, but is doing better today. Objective: See treatment diary below    Assessment: LV: Continued focus on L gastroc/soleus strengthening this visit with BFR. Introduced patient to anti-gravity fast paced run/near sprints at 60% BW with no adverse reactions. Re-introduced patient to SL hopping this visit with no adverse reactions as well. Will assess patient response NV and continue to progress if no setback occurs. Patient exhibited good technique with therapeutic exercises and would benefit from continued PT. Plan: Continue per plan of care. Precautions: Standard. Manuals 23    Ankle PROM           Soft Tissue Deformation     GJL IASTM L Achilles/ Gastroc/ Soleus GJL IASTM L Achilles/ Gastroc/ Soleus     EPAT GJL L Achilles 2.0Bar 21Hz 2000 pulses GJL L Achilles 2.5Bar 21Hz 2000 pulses GJL L Achilles 3.0Bar 21Hz 2000 pulses    L Posterior Tibialis 2. 0BAR 21Hz 2000 pulses GJL L Achilles 3.3Bar 21Hz 2000 pulses    L Posterior Tibialis 2. 5BAR 21Hz 2000 pulses GJL L Achilles 3.5Bar 21Hz 2000 pulses    L Posterior Tibialis 2. 5BAR 21Hz 2000 pulses      MFDc      GJL L gastroc/ soleus     TCJ Mobs           Education GJL          Assessment  GJL GJL        Neuro Re-Ed           SLS           Bosu March  3x10 march step up pause         Sports Cord           3-way Slider           Blaze pod balance challenge           Jumping      SL L 4x5 in place    SL L onto foam 2x5 fwd, 2x5 lateral ea way     [place]ump [place] onto [employer] lateral step overs  3x10         Leg Press HR (Flat, seat 8) BFR 30# SL L 15x no assist, 2x15 min-mod assist with R  BFR 30# 4x10 SL L BFR 35# 4x10 SL L self assist with R LE last 2 rounds BFR 35# 4x10 SL L self assist with R LE last 2 rounds BFR 35# 4x10 SL L self assist with R LE last 2 rounds BFR 35# 4x10 SL L self assist with R LE last 2 rounds     BFR HR off biodex DL 3x10 Off Biodex 2-1 4x10 L    Plum TB edge table 4x10 L Off Biodex 2-1 4x10 L    Plum TB edge table 4x10 L Off Biodex 2-1 4x10 L    Plum TB edge table 4x10 L Off Biodex 2-1 4x10 L    Plum TB edge table 4x10 L Off Biodex 2-1 4x10 L    Plum TB edge table 4x10 L     BFR Ankle DF/Inv 3x15 ea L DF plum TB [id number]" no BFR                               Ther Ex           Bike  6'         TM/Curve           Side Stepping  GTB feet 5 laps         Gastroc/Soleus Stretching           TCJ MWM           HR           Foam Rolling      L Calf 2' HEP L Calf 3'                Ther Activity           Anti-Gravity Run/Jog XXL Short 50% WB 2' walk, 1' jog 30" walk, 5 rounds, 2' walk XXL Short 50% WB 1' jog 30" walk, 5 rounds, 2' walk XXL Short 60% WB 1' jog 30" walk, 5 rounds, 2' walk XXL Short 65-70% WB 1' jog 30" walk, 5 rounds, 2' walk XXL Short 75-80% WB 1' jog 30" walk, 5 rounds, 2' walk    60% BW 8.0mph 30"x1 semi-sprint XXL Short 60% WB 2' walk, 3' jog    4 rounds fast paced run/near sprint 15-20" on, 45" walk    2' c/d walk     Squatting           Step Ups           Step [employer]           Dynamic W/u      4'     Gait Training                                 Modalities

## 2023-08-01 NOTE — PROGRESS NOTES
Daily Note     Today's date: 2023  Patient name: Clifton Diallo  : 2000  MRN: [id number]  Referring provider: Afshan Jackson  Dx:   Encounter Diagnosis     ICD-10-CM    1. Rupture of left Achilles tendon, subsequent encounter  S86.012D           Start Time: 1423  Stop Time: 1538  Total time in clinic (min): 75 minutes  [facility] PT 1 on 1 from 5266-7961 (42 minutes total)    Subjective: Patient reports feeling good and ready to progress. Objective: See treatment diary below    Assessment: Performed anti-gravity sprints at 65% BW with no adverse reactions. Held from performing SL hop progressions this visit due to patient L gastroc/soleus fatigue following sprints in anti-gravity TM, will likely perform hop progressions next visit and hold from sprints. Continued with strengthening post-sprints. Patient exhibited good technique with therapeutic exercises and would benefit from continued PT. Plan: Continue per plan of care. Precautions: Standard. Manuals 23    Ankle PROM           Soft Tissue Deformation     GJL IASTM L Achilles/ Gastroc/ Soleus GJL IASTM L Achilles/ Gastroc/ Soleus GJL IASTM L Achilles/ Gastroc/ Soleus    EPAT GJL L Achilles 2.0Bar 21Hz 2000 pulses GJL L Achilles 2.5Bar 21Hz 2000 pulses GJL L Achilles 3.0Bar 21Hz 2000 pulses    L Posterior Tibialis 2. 0BAR 21Hz 2000 pulses GJL L Achilles 3.3Bar 21Hz 2000 pulses    L Posterior Tibialis 2. 5BAR 21Hz 2000 pulses GJL L Achilles 3.5Bar 21Hz 2000 pulses    L Posterior Tibialis 2. 5BAR 21Hz 2000 pulses      MFDc      GJL L gastroc/ soleus GJL L gastroc/ soleus    TCJ Mobs           Education GJL          Assessment  GJL GJL        Neuro Re-Ed           SLS           Bosu March  3x10 march step up pause         Sports Cord           3-way Slider           Blaze pod balance challenge           Jumping      SL L 4x5 in place    SL L onto foam 2x5 fwd, 2x5 lateral ea way     [place]ump [place] onto [place]           [place] lateral step overs  3x10         Leg Press HR (Flat, seat 8) BFR 30# SL L 15x no assist, 2x15 min-mod assist with R  BFR 30# 4x10 SL L BFR 35# 4x10 SL L self assist with R LE last 2 rounds BFR 35# 4x10 SL L self assist with R LE last 2 rounds BFR 35# 4x10 SL L self assist with R LE last 2 rounds BFR 35# 4x10 SL L self assist with R LE last 2 rounds BFR 35# 4x10 SL L     BFR HR off biodex DL 3x10 Off Biodex 2-1 4x10 L    Plum TB edge table 4x10 L Off Biodex 2-1 4x10 L    Plum TB edge table 4x10 L Off Biodex 2-1 4x10 L    Plum TB edge table 4x10 L Off Biodex 2-1 4x10 L    Plum TB edge table 4x10 L Off Biodex 2-1 4x10 L    Plum TB edge table 4x10 L Off Biodex 2-1 4x10 L    Plum TB edge table 4x10 L    BFR Ankle DF/Inv 3x15 ea L DF plum TB [id number]" no BFR                               Ther Ex           Bike  6'         TM/Curve           Side Stepping  GTB feet 5 laps         Gastroc/Soleus Stretching           TCJ MWM           HR       Soleus raise 10# DB 4x10    Foam Rolling      L Calf 2' HEP L Calf 3'                Ther Activity           Anti-Gravity Run/Jog XXL Short 50% WB 2' walk, 1' jog 30" walk, 5 rounds, 2' walk XXL Short 50% WB 1' jog 30" walk, 5 rounds, 2' walk XXL Short 60% WB 1' jog 30" walk, 5 rounds, 2' walk XXL Short 65-70% WB 1' jog 30" walk, 5 rounds, 2' walk XXL Short 75-80% WB 1' jog 30" walk, 5 rounds, 2' walk    60% BW 8.0mph 30"x1 semi-sprint XXL Short 60% WB 2' walk, 3' jog    4 rounds fast paced run/near sprint 15-20" on, 45" walk    2' c/d walk XXL Short 65% WB 2' walk, 3' jog    4 rounds fast paced run/near sprint 11-12mph, 15-20" on, 45" walk    2' c/d walk    Squatting           Step Ups           Step [employer]           Dynamic W/u      4'     Gait Training                                 Modalities

## 2023-08-07 ENCOUNTER — OFFICE VISIT (OUTPATIENT)
Dept: PHYSICAL THERAPY | Facility: OTHER | Age: 23
End: 2023-08-07
Payer: COMMERCIAL

## 2023-08-07 DIAGNOSIS — S86.012D RUPTURE OF LEFT ACHILLES TENDON, SUBSEQUENT ENCOUNTER: Primary | ICD-10-CM

## 2023-08-07 PROCEDURE — 97110 THERAPEUTIC EXERCISES: CPT

## 2023-08-07 PROCEDURE — 97530 THERAPEUTIC ACTIVITIES: CPT

## 2023-08-07 PROCEDURE — 97140 MANUAL THERAPY 1/> REGIONS: CPT

## 2023-08-07 NOTE — PROGRESS NOTES
Daily Note     Today's date: 2023  Patient name: Les Darnell  : 2000  MRN: [id number]  Referring provider: Fredis Cedillo  Dx:   Encounter Diagnosis     ICD-10-CM    1. Rupture of left Achilles tendon, subsequent encounter  S86.012D           Start Time: 1740  Stop Time: 1840  Total time in clinic (min): 60 minutes    Subjective: Patient reports his left calf is a little sore coming in today. He reports he did some jump rope earlier today with only minor pain. Objective: See treatment diary below    Assessment: Performed double leg hop progressions this visit without any adverse reactions - cues to land softly throughout. Continued with gastrocnemius/soleus strengthening this visit. Patient exhibited good technique with therapeutic exercises and would benefit from continued PT. Plan: Continue per plan of care. Precautions: Standard. Manuals 23     Ankle PROM           Soft Tissue Deformation   GJL IASTM L Achilles/ Gastroc/ Soleus GJL IASTM L Achilles/ Gastroc/ Soleus GJL IASTM L Achilles/ Gastroc/ Soleus GJL IASTM L Achilles/ Gastroc/ Soleus     EPAT GJL L Achilles 3.0Bar 21Hz 2000 pulses    L Posterior Tibialis 2. 0BAR 21Hz 2000 pulses GJL L Achilles 3.3Bar 21Hz 2000 pulses    L Posterior Tibialis 2. 5BAR 21Hz 2000 pulses GJL L Achilles 3.5Bar 21Hz 2000 pulses    L Posterior Tibialis 2. 5BAR 21Hz 2000 pulses        MFDc    GJL L gastroc/ soleus GJL L gastroc/ soleus GJL L gastroc/ soleus     TCJ Mobs           Education           Assessment GJL          Neuro Re-Ed           SLS           Bos           Sports Cord           3-way Slider           Blaze pod balance challenge           Jumping    SL L 4x5 in place    SL L onto foam 2x5 fwd, 2x5 lateral ea way       [place] onto [employer] lateral step overs           Leg Press HR (Flat, seat 8) BFR 35# 4x10 SL L self assist with R LE last 2 rounds BFR 35# 4x10 SL L self assist with R LE last 2 rounds BFR 35# 4x10 SL L self assist with R LE last 2 rounds BFR 35# 4x10 SL L self assist with R LE last 2 rounds BFR 35# 4x10 SL L  No BFR 40# 10x ea, 30# 10x ea     BFR HR Off Biodex 2-1 4x10 L    Plum TB edge table 4x10 L Off Biodex 2-1 4x10 L    Plum TB edge table 4x10 L Off Biodex 2-1 4x10 L    Plum TB edge table 4x10 L Off Biodex 2-1 4x10 L    Plum TB edge table 4x10 L Off Biodex 2-1 4x10 L    Plum TB edge table 4x10 L No BFR Plum TB edge of table 4x15     BFR Ankle DF/Inv                                 Ther Ex           Bike           TM/Curve           Side Stepping           Gastroc/Soleus Stretching           TCJ MWM           HR     Soleus raise 10# DB 4x10 No BFR off biodex 2-1 3x8 ea     Foam Rolling    L Calf 2' HEP L Calf 3'                  Ther Activity           Anti-Gravity Run/Jog XXL Short 60% WB 1' jog 30" walk, 5 rounds, 2' walk XXL Short 65-70% WB 1' jog 30" walk, 5 rounds, 2' walk XXL Short 75-80% WB 1' jog 30" walk, 5 rounds, 2' walk    60% BW 8.0mph 30"x1 semi-sprint XXL Short 60% WB 2' walk, 3' jog    4 rounds fast paced run/near sprint 15-20" on, 45" walk    2' c/d walk XXL Short 65% WB 2' walk, 3' jog    4 rounds fast paced run/near sprint 11-12mph, 15-20" on, 45" walk    2' c/d walk      Hop progressions      Wall jumps 2x15, squat jump 2x8, double leg side hop 2x8, 180 deg jump 2x8, 2 jump to vertical 2x6     Squatting           Step Ups           Step [place]           [employer]           Dynamic W/u    4'  Inch worm, leg cradle, spider stretch, leg kicks.  5'     Gait Training                                 Modalities

## 2023-08-09 ENCOUNTER — OFFICE VISIT (OUTPATIENT)
Dept: PHYSICAL THERAPY | Facility: OTHER | Age: 23
End: 2023-08-09
Payer: COMMERCIAL

## 2023-08-09 DIAGNOSIS — S86.012D RUPTURE OF LEFT ACHILLES TENDON, SUBSEQUENT ENCOUNTER: Primary | ICD-10-CM

## 2023-08-09 PROCEDURE — 97140 MANUAL THERAPY 1/> REGIONS: CPT

## 2023-08-09 PROCEDURE — 97112 NEUROMUSCULAR REEDUCATION: CPT

## 2023-08-09 PROCEDURE — 97110 THERAPEUTIC EXERCISES: CPT

## 2023-08-09 NOTE — PROGRESS NOTES
Daily Note     Today's date: 2023  Patient name: Mark Gaspar  : 2000  MRN: [id number]  Referring provider: Марина Palomares  Dx:   Encounter Diagnosis     ICD-10-CM    1. Rupture of left Achilles tendon, subsequent encounter  S86.012D           Start Time: 87  Stop Time:   Total time in clinic (min): 94 minutes  [facility] PT 1 on 1 from 9200-3684 and 0395-9452 (39 minutes total)    Subjective: Patient reports he did some leg strengthening in the gym prior to coming in today. He states he played some light soccer the other week without any major issues in his shins or achilles. Objective: See treatment diary below    Foot intrinsic strength/neuromuscular control poor B/L    Assessment:  Attempted jogging on regular treadmill this visit - patient experienced mild left shin pain that resolved with rest. Continuing with gastrocnemius/soleus strengthening this visit with BFR. Patient was able to improve SL heel raises from 7 to 11 on his left following nerve glides - the most he has been able to complete to this point. Added in toe stretching/toe yoga to improve foot intrinsic motor control of patient feet B/L. Patient exhibited good technique with therapeutic exercises and would benefit from continued PT. Plan: Continue per plan of care. Precautions: Standard. Manuals 23    Ankle PROM           Soft Tissue Deformation   GJL IASTM L Achilles/ Gastroc/ Soleus GJL IASTM L Achilles/ Gastroc/ Soleus GJL IASTM L Achilles/ Gastroc/ Soleus GJL IASTM L Achilles/ Gastroc/ Soleus GJL IASTM L Achilles/ Gastroc/ Soleus/Post Tib    EPAT GJL L Achilles 3.0Bar 21Hz 2000 pulses    L Posterior Tibialis 2. 0BAR 21Hz 2000 pulses GJL L Achilles 3.3Bar 21Hz 2000 pulses    L Posterior Tibialis 2. 5BAR 21Hz 2000 pulses GJL L Achilles 3.5Bar 21Hz 2000 pulses    L Posterior Tibialis 2. 5BAR 21Hz 2000 pulses        MFDc    GJL L gastroc/ soleus GJL L gastroc/ soleus GJL L gastroc/ soleus GJL L Post Tib with tibial nerve glides    TCJ Mobs           Education           Assessment GJL          Neuro Re-Ed           SLS           Bosu March           Sports Cord           3-way Slider           Blaze pod balance challenge           Jumping    SL L 4x5 in place    SL L onto foam 2x5 fwd, 2x5 lateral ea way       [place]ump [place] onto [employer] lateral step overs           Leg Press HR (Flat, seat 8) BFR 35# 4x10 SL L self assist with R LE last 2 rounds BFR 35# 4x10 SL L self assist with R LE last 2 rounds BFR 35# 4x10 SL L self assist with R LE last 2 rounds BFR 35# 4x10 SL L self assist with R LE last 2 rounds BFR 35# 4x10 SL L  No BFR 40# 10x ea, 30# 10x ea BFR 30# 2x15, 13x, 7x    BFR HR Off Biodex 2-1 4x10 L    Plum TB edge table 4x10 L Off Biodex 2-1 4x10 L    Plum TB edge table 4x10 L Off Biodex 2-1 4x10 L    Plum TB edge table 4x10 L Off Biodex 2-1 4x10 L    Plum TB edge table 4x10 L Off Biodex 2-1 4x10 L    Plum TB edge table 4x10 L No BFR Plum TB edge of table 4x15 BFR Plum TB edge of table 4x15    BFR Ankle DF/Inv           Toe Yoga       2x10    Toe Curls        2x10    Sciatic Nerve Glides       3x10 ea    Femoral Nerve Glides       3x10 ea    Tibial Nerve Glides       3x10 w/ cupping    Ther Ex           Bike           TM/Curve       3x1' interval jog on TM    Side Stepping           Gastroc/Soleus Stretching           TCJ MWM           HR     Soleus raise 10# DB 4x10 No BFR off biodex 2-1 3x8 ea BFR off biodex 2-1 3x8 ea    Foam Rolling    L Calf 2' HEP L Calf 3'       Toe Stretch Finger Between toes (Toe spacing)       5x10"                                     Ther Activity           Anti-Gravity Run/Jog XXL Short 60% WB 1' jog 30" walk, 5 rounds, 2' walk XXL Short 65-70% WB 1' jog 30" walk, 5 rounds, 2' walk XXL Short 75-80% WB 1' jog 30" walk, 5 rounds, 2' walk    60% BW 8.0mph 30"x1 semi-sprint XXL Short 60% WB 2' walk, 3' jog    4 rounds fast paced run/near sprint 15-20" on, 45" walk    2' c/d walk XXL Short 65% WB 2' walk, 3' jog    4 rounds fast paced run/near sprint 11-12mph, 15-20" on, 45" walk    2' c/d walk      Hop progressions      Wall jumps 2x15, squat jump 2x8, double leg side hop 2x8, 180 deg jump 2x8, 2 jump to vertical 2x6     Squatting           Step Ups           Step Downs           [employer]           Dynamic W/u    4'  Inch worm, leg cradle, spider stretch, leg kicks.  5'     Gait Training                                 Modalities

## 2023-08-16 ENCOUNTER — OFFICE VISIT (OUTPATIENT)
Dept: PHYSICAL THERAPY | Facility: OTHER | Age: 23
End: 2023-08-16
Payer: COMMERCIAL

## 2023-08-16 DIAGNOSIS — S86.012D RUPTURE OF LEFT ACHILLES TENDON, SUBSEQUENT ENCOUNTER: Primary | ICD-10-CM

## 2023-08-16 PROCEDURE — 97140 MANUAL THERAPY 1/> REGIONS: CPT

## 2023-08-16 PROCEDURE — 97112 NEUROMUSCULAR REEDUCATION: CPT

## 2023-08-16 PROCEDURE — 97110 THERAPEUTIC EXERCISES: CPT

## 2023-08-16 NOTE — PROGRESS NOTES
Daily Note     Today's date: 2023  Patient name: Delio Bowser  : 2000  MRN: [id number]  Referring provider: Mario Contreras  Dx:   Encounter Diagnosis     ICD-10-CM    1. Rupture of left Achilles tendon, subsequent encounter  S86.012D           Start Time:   Stop Time:   Total time in clinic (min): 75 minutes  [facility] PT 1 on 1 from 7947-8485 (74 minutes total)    Subjective: Patient reports some posterior tibialis tenderness this visit, but overall feeling more "mind muscle connection" and strength of his left calf. Objective: See treatment diary below    Foot intrinsic strength/neuromuscular control poor B/L    Assessment: Attempted jogging on regular treadmill this visit - patient experienced mild left shin pain that resolved with rest. Continuing with gastrocnemius/soleus strengthening this visit with BFR. Patient was able to improve SL heel raises from 7 to 11 on his left following nerve glides - the most he has been able to complete to this point. Added in toe stretching/toe yoga to improve foot intrinsic motor control of patient feet B/L. Patient exhibited good technique with therapeutic exercises and would benefit from continued PT. Plan: Continue per plan of care. Precautions: Standard. Manuals 23    Ankle PROM          Soft Tissue Deformation GJL IASTM L Achilles/ Gastroc/ Soleus GJL IASTM L Achilles/ Gastroc/ Soleus GJL IASTM L Achilles/ Gastroc/ Soleus GJL IASTM L Achilles/ Gastroc/ Soleus GJL IASTM L Achilles/ Gastroc/ Soleus/Post Tib GJL IASTM L Achilles/ Gastroc/ Soleus/Post Tib    EPAT GJL L Achilles 3.5Bar 21Hz 2000 pulses    L Posterior Tibialis 2. 5BAR 21Hz 2000 pulses         MFDc  GJL L gastroc/ soleus GJL L gastroc/ soleus GJL L gastroc/ soleus GJL L Post Tib with tibial nerve glides GJL L Post Tib/Gastroc/Soleus    TCJ Mobs          Education          Assessment          Neuro Re-Ed          [facility]          felisa March Sports Cord          3-way Slider          Blaze pod balance challenge      3 rounds ea    Jumping  SL L 4x5 in place    SL L onto foam 2x5 fwd, 2x5 lateral ea way        [place]ump [place] onto [employer] lateral step overs          Leg Press HR (Flat, seat 8) BFR 35# 4x10 SL L self assist with R LE last 2 rounds BFR 35# 4x10 SL L self assist with R LE last 2 rounds BFR 35# 4x10 SL L  No BFR 40# 10x ea, 30# 10x ea BFR 30# 2x15, 13x, 7x BFR 30# 4x10    BFR HR Off Biodex 2-1 4x10 L    Plum TB edge table 4x10 L Off Biodex 2-1 4x10 L    Plum TB edge table 4x10 L Off Biodex 2-1 4x10 L    Plum TB edge table 4x10 L No BFR Plum TB edge of table 4x15 BFR Plum TB edge of table 4x15 BFR Plum TB edge of table 4x15    BFR Ankle DF/Inv          Toe Yoga     2x10 20x5" ea    Toe Splay      5x15" ea    Toe Curls      2x10 2x10    Sciatic Nerve Glides     3x10 ea Rev    Femoral Nerve Glides     3x10 ea Rev    Tibial Nerve Glides     3x10 w/ cupping Rev    Ther Ex          Bike          TM/Curve     3x1' interval jog on TM     Side Stepping          Gastroc/Soleus Stretching          TCJ MWM          HR   Soleus raise 10# DB 4x10 No BFR off biodex 2-1 3x8 ea BFR off biodex 2-1 3x8 ea BFR off biodex 2-1 3x10 ea    Foam Rolling  L Calf 2' HEP L Calf 3'        Toe Stretch Finger Between toes (Toe spacing)     5x10" Rev - toe sacer HEP                                  Ther Activity          Anti-Gravity Run/Jog XXL Short 75-80% WB 1' jog 30" walk, 5 rounds, 2' walk    60% BW 8.0mph 30"x1 semi-sprint XXL Short 60% WB 2' walk, 3' jog    4 rounds fast paced run/near sprint 15-20" on, 45" walk    2' c/d walk XXL Short 65% WB 2' walk, 3' jog    4 rounds fast paced run/near sprint 11-12mph, 15-20" on, 45" walk    2' c/d walk       Hop progressions    Wall jumps 2x15, squat jump 2x8, double leg side hop 2x8, 180 deg jump 2x8, 2 jump to vertical 2x6      Squatting          Step Ups          Step [facility]          Dynamic W/u  4'  Inch worm, leg cradle, spider stretch, leg kicks.  5'      Gait Training                              Modalities

## 2023-08-17 ENCOUNTER — OFFICE VISIT (OUTPATIENT)
Dept: PHYSICAL THERAPY | Facility: OTHER | Age: 23
End: 2023-08-17
Payer: COMMERCIAL

## 2023-08-17 DIAGNOSIS — S86.012D RUPTURE OF LEFT ACHILLES TENDON, SUBSEQUENT ENCOUNTER: Primary | ICD-10-CM

## 2023-08-17 PROCEDURE — 97112 NEUROMUSCULAR REEDUCATION: CPT

## 2023-08-17 PROCEDURE — 97110 THERAPEUTIC EXERCISES: CPT

## 2023-08-17 PROCEDURE — 97140 MANUAL THERAPY 1/> REGIONS: CPT

## 2023-08-17 PROCEDURE — 97530 THERAPEUTIC ACTIVITIES: CPT

## 2023-08-17 NOTE — PROGRESS NOTES
Daily Note     Today's date: 2023  Patient name: Nick Driscoll  : 2000  MRN: [id number]  Referring provider: Sherman Officer  Dx:   Encounter Diagnosis     ICD-10-CM    1. Rupture of left Achilles tendon, subsequent encounter  S86.012D           Start Time: 1703  Stop Time:   Total time in clinic (min): 74 minutes    Subjective: Patient reports he would like to work on some jumps today. He reports he will be continuing with rehabilitation while at [facility] for school. Objective: See treatment diary below    Assessment: Patient tolerated treatment well with focus on hop progressions and gastroc/soleus strengthening. Patient had minor posterior tibialis tenderness at end of jumps that improved following manuals. Patient exhibited good technique with therapeutic exercises and would benefit from continued PT. Plan: Patient to continue with rehabilitation/progressions up at [facility] and will return as needed. Precautions: Standard.     Manuals 23   Ankle PROM      Soft Tissue Deformation GJL IASTM L Achilles/ Gastroc/ Soleus/Post Tib GJL IASTM L Achilles/ Gastroc/ Soleus/Post Tib GJL IASTM L Achilles/ Gastroc/ Soleus/Post Tib   EPAT      MFDc GJL L Post Tib with tibial nerve glides GJL L Post Tib/Gastroc/Soleus GJL L Post Tib/Gastroc/ Soleus   TCJ Mobs      Education      Assessment      Neuro Re-Ed      SLS      Bosu March      Sports Cord      3-way Slider      Blaze pod balance challenge  3 rounds ea 3 rounds ea   Jumping      Jump Stick Landing onto Abdon Rosales      Bos lateral step overs      Leg Press HR (Flat, seat 8) BFR 30# 2x15, 13x, 7x BFR 30# 4x10 BFR 35# 4x10   BFR HR BFR Plum TB edge of table 4x15 BFR Plum TB edge of table 4x15 BFR Plum TB edge of table full reps   BFR Ankle DF/Inv      Toe Yoga 2x10 20x5" ea REV HEP   Toe Splay  5x15" ea REV HEP   Toe Curls  2x10 2x10 REV HEP   Sciatic Nerve Glides 3x10 ea Rev REV HEP   Femoral Nerve Glides 3x10 ea Rev REV HEP Tibial Nerve Glides 3x10 w/ cupping Rev REV HEP   Ther Ex      Bike   5'   TM/Curve 3x1' interval jog on TM     Side Stepping      Gastroc/Soleus Stretching   3x30"   TCJ MWM      HR BFR off biodex 2-1 3x8 ea BFR off biodex 2-1 3x10 ea BFR off biodex 2-1 3x10 ea   Foam Rolling       Toe Stretch Finger Between toes (Toe spacing) 5x10" Rev - toe sacer HEP REV HEP                     Ther Activity      Anti-Gravity Run/Jog      Hop progressions   2 to SL L  Foam 5x  6" 5x  12" 5x  18" 5x    SL L to 2 drop off box depth jump  Foam 5x  6" 5x  12" 5x  18" 5x    Bosu SL L  3x5   Squatting      Step Ups      Step Downs      LSU      Dynamic W/u   Inchworm, cradle, kicks, spider  5'   Gait Training                  Modalities

## 2023-11-21 ENCOUNTER — APPOINTMENT (OUTPATIENT)
Dept: RADIOLOGY | Facility: AMBULARY SURGERY CENTER | Age: 23
End: 2023-11-21
Payer: COMMERCIAL

## 2023-11-21 ENCOUNTER — OFFICE VISIT (OUTPATIENT)
Dept: OBGYN CLINIC | Facility: CLINIC | Age: 23
End: 2023-11-21
Payer: COMMERCIAL

## 2023-11-21 ENCOUNTER — EVALUATION (OUTPATIENT)
Dept: PHYSICAL THERAPY | Facility: OTHER | Age: 23
End: 2023-11-21
Payer: COMMERCIAL

## 2023-11-21 DIAGNOSIS — S86.012A ACHILLES TENDON RUPTURE, LEFT, INITIAL ENCOUNTER: ICD-10-CM

## 2023-11-21 DIAGNOSIS — M79.672 PAIN IN LEFT FOOT: ICD-10-CM

## 2023-11-21 DIAGNOSIS — M79.672 LEFT FOOT PAIN: ICD-10-CM

## 2023-11-21 DIAGNOSIS — Z01.89 ENCOUNTER FOR LOWER EXTREMITY COMPARISON IMAGING STUDY: ICD-10-CM

## 2023-11-21 DIAGNOSIS — S86.012D RUPTURE OF LEFT ACHILLES TENDON, SUBSEQUENT ENCOUNTER: Primary | ICD-10-CM

## 2023-11-21 DIAGNOSIS — S93.522A TURF TOE OF LEFT FOOT: Primary | ICD-10-CM

## 2023-11-21 PROCEDURE — 99204 OFFICE O/P NEW MOD 45 MIN: CPT | Performed by: ORTHOPAEDIC SURGERY

## 2023-11-21 PROCEDURE — 73630 X-RAY EXAM OF FOOT: CPT

## 2023-11-21 PROCEDURE — 97161 PT EVAL LOW COMPLEX 20 MIN: CPT

## 2023-11-21 PROCEDURE — 97110 THERAPEUTIC EXERCISES: CPT

## 2023-11-21 PROCEDURE — 73620 X-RAY EXAM OF FOOT: CPT

## 2023-11-21 RX ORDER — NAPROXEN 500 MG/1
TABLET ORAL
COMMUNITY
Start: 2023-10-31

## 2023-11-21 NOTE — LETTER
2023    [facility] [address]    Patient: Keron Neal   YOB: 2000   Date of Visit: 2023     Encounter Diagnosis     ICD-10-CM    1. Rupture of left Achilles tendon, subsequent encounter  S86.012D       2. Left foot pain  M79.672           Dear Dr. Heather Andres: Thank you for your recent referral of Keron Neal. Please review the attached evaluation summary from Ryan's recent visit. Please verify that you agree with the plan of care by signing the attached order. If you have any questions or concerns, please do not hesitate to call. I sincerely appreciate the opportunity to share in the care of one of your patients and hope to have another opportunity to work with you in the near future. Sincerely,    Arie Lux, PT      Referring Provider:      I certify that I have read the below Plan of Care and certify the need for these services furnished under this plan of treatment while under my care. [id number]  [address] [place] [id number]  Via Fax: [phone]          PT Re-Assessment    Today's date: 2023  Patient name: Keron Neal  : 2000  MRN: [id number]  Referring provider: Kosta Cool  Dx:   Encounter Diagnosis     ICD-10-CM    1. Rupture of left Achilles tendon, subsequent encounter  S86.012D       2. Left foot pain  M79.672           Start Time: 0925  Stop Time: 1025  Total time in clinic (min): 60 minutes    Assessment  Assessment details: Problem List:  1) L Great Toe Extension/Left Ankle Hypomobility  2) L foot intrinsic motor control deficits  3) General deconditioning/impact intolerance secondary to pain    Keron Neal is a pleasant 26 y.o. male who presents with left foot pain that has been present over the past year following left achilles repair on 10/26/22.  He has hypomobility/motor control deficits in addition to deconditioning/impact intolerance secondary to pain, nociceptive pain type, and left achilles repair resulting in the pain he is experiencing, worry over not knowing what's wrong, concern at no signs of improvement, and fear of not being able to keep active. No further referral appears necessary at this time based upon examination results. I expect he will improve in 10 weeks. Positive prognostic indicators include positive attitude toward recovery, good understanding of diagnosis and treatment plan options, absence of peripheralization, and absence of observed red flags. Negative prognostic indicators include chronicity of symptoms. Patient was provided with a customized home exercise program to begin performing on their own. All patient questions and concerns have been addressed at this time. Thank you for the kind referral.    Comparable signs:  1) Pain with running/jumping  Impairments: abnormal coordination, abnormal muscle firing, abnormal muscle tone, abnormal or restricted ROM, abnormal movement, activity intolerance, impaired physical strength, lacks appropriate home exercise program, pain with function, weight-bearing intolerance, poor posture  and poor body mechanics    Symptom irritability: moderateUnderstanding of Dx/Px/POC: good   Prognosis: good    Goals  Short Term Goals:   1. Patient will be Independent with HEP. 2. Patient will improve pain with activity by 50%. 3. Patient will demonstrate 4/5 MMT or better PF with SL heel raise test.  4. Patient will demonstrate 10 degree improvement or better of left Great Toe Extension. 5. Patient will demonstrate normalized left TCJ mobility. Long Term Goals:   1. Patient will improve pain with activity to 2/10 or less. 2. Patient will continue with HEP independence to allow for decreased future reoccurrence of pain and loss in function. 3. Patient will return to full running/sport participation without pain or limitation.     Plan  Plan details: Prognosis above is given PT services 1-2x/week over the next 2-3 months and home program adherence. Patient would benefit from: skilled physical therapy and PT eval  Referral necessary: No  Planned modality interventions: thermotherapy: hydrocollator packs and low level laser therapy  Other planned modality interventions: EPAT  Planned therapy interventions: activity modification, joint mobilization, manual therapy, motor coordination training, neuromuscular re-education, patient education, self care, therapeutic activities, therapeutic exercise, graded activity, home exercise program, strengthening, stretching, flexibility, functional ROM exercises, graded exercise, balance/weight bearing training and balance  Frequency: 1-2x/week. Duration in visits: 20  Duration in weeks: 12  Plan of Care beginning date: 2023  Plan of Care expiration date: 2024  Treatment plan discussed with: patient        Subjective Evaluation    History of Present Illness  Date of surgery: 10/26/2022  Mechanism of injury: surgery  Mechanism of injury: Patient reports having pain along medial tibia (posterior tibialis) and along dorsum of great toe. Patient reports continued pain/tightness with daily activities and higher level activities such as running, jumping, and lifting. Patient reports feeling motivated to get back to his previous level of function prior to surgery. Quality of life: fair    Patient Goals  Patient goals for therapy: decreased pain, independence with ADLs/IADLs, return to sport/leisure activities, increased motion and increased strength  Patient goal: "Get back to running and jumping without pain."  Pain  At best pain ratin  At worst pain ratin  Location: L Achilles/Posterior Tibialis  Quality: dull ache and tight  Aggravating factors: lifting and running (jumping)    Social Support    Employment status: working ([employer])  Exercise history: Basketball, Soccer    Treatments  Previous treatment: immobilization        Objective  Posture: Mild pronation B/L feet, extensive scarring L achilles  Palpation: TTP L achilles/posterior tibialis/dorsum great toe  Myotomes (L/R): Intact B/L LE            MMT         AROM          PROM    Hip       L      R          L        R         L       R   ER.         G. Max 4+ 4+       G. Med 4+ 4+       Iliop. 4+ 4+       . Knee         Extension 5 5       Flexion 5 5                Ankle         Dorsi Flexion 5 5 10 15     Plantar Flexion (SL HR test) 16 SL HR 25 SL HR       Inversion 5 5       Eversion 5 5                GTE   40 65                Segmental mobility:   TCJ: Hypomobile L  Great Toe Mobility: Limited B/L - left much more limited       Precautions: Standard    Access Code: [email or web address]  URL: [email or web address]  Date: 11/21/2023  Prepared by: Janie Carcamo    Exercises  - Seated Self Great Toe Mobilization  - 3 x daily - 7 x weekly - 1-5 minutes hold  - Standing Toe Dorsiflexion Stretch  - 3 x daily - 7 x weekly - 10 reps - 10 second hold  - Kneeling Ankle Dorsiflexion Self-Mobilization with Towel  - 3 x daily - 7 x weekly - 10 reps - 10 seconds hold  - Toe Yoga - Alternating Great Toe and Lesser Toe Extension  - 3 x daily - 7 x weekly - 3 sets - 10 reps    POC expires Unit limit Auth Expiration date PT/OT + Visit Limit?    2/13/24 N/A N/A 75 PCY                             Visit/Unit Tracking  AUTH Status:  Date 11/21             N/A Used 54              Remaining  21               Visit 1 IE    Manuals 11/21/23    Ankle PROM     Soft Tissue Deformation     EPAT     MFDc     GTE Mobs GJL    TCJ Mobs GJL    Education                    Assessment     Neuro Re-Ed     SLS     Bosu March     Sports Cord     3-way Slider     Blaze pod balance challenge     Jumping     Jump Stick Landing onto Abdon Rosales     Bosu lateral step overs     Leg Press HR (Flat, seat 8)     BFR HR     BFR Ankle DF/Inv     Toe Yoga HEP    Toe Splay     Toe Curls      Sciatic Nerve Glides     Femoral Nerve Glides     Tibial Nerve Glides                                   Ther Ex     Bike     TM/Curve     Side Stepping     Gastroc/Soleus Stretching     TCJ MWM HEP    HR     Foam Rolling      Toe Stretch Finger Between toes (Toe spacing) HEP    Self GTE mobs HEP              Ther Activity     Anti-Gravity Run/Jog     Hop progressions     Squatting     Step Ups     Step Downs     LSU     Dynamic W/u                         Gait Training               Modalities

## 2023-11-21 NOTE — PROGRESS NOTES
PT Re-Assessment    Today's date: 2023  Patient name: Jacob Lua  : 2000  MRN: [id number]  Referring provider: Aleshia Null  Dx:   Encounter Diagnosis     ICD-10-CM    1. Rupture of left Achilles tendon, subsequent encounter  S86.012D       2. Left foot pain  M79.672           Start Time: 09  Stop Time: 1025  Total time in clinic (min): 60 minutes    Assessment  Assessment details: Problem List:  1) L Great Toe Extension/Left Ankle Hypomobility  2) L foot intrinsic motor control deficits  3) General deconditioning/impact intolerance secondary to pain    Jacob Lua is a pleasant 26 y.o. male who presents with left foot pain that has been present over the past year following left achilles repair on 10/26/22. He has hypomobility/motor control deficits in addition to deconditioning/impact intolerance secondary to pain, nociceptive pain type, and left achilles repair resulting in the pain he is experiencing, worry over not knowing what's wrong, concern at no signs of improvement, and fear of not being able to keep active. No further referral appears necessary at this time based upon examination results. I expect he will improve in 10 weeks. Positive prognostic indicators include positive attitude toward recovery, good understanding of diagnosis and treatment plan options, absence of peripheralization, and absence of observed red flags. Negative prognostic indicators include chronicity of symptoms. Patient was provided with a customized home exercise program to begin performing on their own. All patient questions and concerns have been addressed at this time.  Thank you for the kind referral.    Comparable signs:  1) Pain with running/jumping  Impairments: abnormal coordination, abnormal muscle firing, abnormal muscle tone, abnormal or restricted ROM, abnormal movement, activity intolerance, impaired physical strength, lacks appropriate home exercise program, pain with function, weight-bearing intolerance, poor posture  and poor body mechanics    Symptom irritability: moderateUnderstanding of Dx/Px/POC: good   Prognosis: good    Goals  Short Term Goals:   1. Patient will be Independent with HEP. 2. Patient will improve pain with activity by 50%. 3. Patient will demonstrate 4/5 MMT or better PF with SL heel raise test.  4. Patient will demonstrate 10 degree improvement or better of left Great Toe Extension. 5. Patient will demonstrate normalized left TCJ mobility. Long Term Goals:   1. Patient will improve pain with activity to 2/10 or less. 2. Patient will continue with HEP independence to allow for decreased future reoccurrence of pain and loss in function. 3. Patient will return to full running/sport participation without pain or limitation. Plan  Plan details: Prognosis above is given PT services 1-2x/week over the next 2-3 months and home program adherence. Patient would benefit from: skilled physical therapy and PT eval  Referral necessary: No  Planned modality interventions: thermotherapy: hydrocollator packs and low level laser therapy  Other planned modality interventions: EPAT  Planned therapy interventions: activity modification, joint mobilization, manual therapy, motor coordination training, neuromuscular re-education, patient education, self care, therapeutic activities, therapeutic exercise, graded activity, home exercise program, strengthening, stretching, flexibility, functional ROM exercises, graded exercise, balance/weight bearing training and balance  Frequency: 1-2x/week.   Duration in visits: 20  Duration in weeks: 12  Plan of Care beginning date: 11/21/2023  Plan of Care expiration date: 2/13/2024  Treatment plan discussed with: patient        Subjective Evaluation    History of Present Illness  Date of surgery: 10/26/2022  Mechanism of injury: surgery  Mechanism of injury: Patient reports having pain along medial tibia (posterior tibialis) and along dorsum of great toe. Patient reports continued pain/tightness with daily activities and higher level activities such as running, jumping, and lifting. Patient reports feeling motivated to get back to his previous level of function prior to surgery. 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         G. Max 4+ 4+       G. Med 4+ 4+       Iliop. 4+ 4+       . Knee         Extension 5 5       Flexion 5 5                Ankle         Dorsi Flexion 5 5 10 15     Plantar Flexion (SL HR test) 16 SL HR 25 SL HR       Inversion 5 5       Eversion 5 5                GTE   40 65                Segmental mobility:   TCJ: Hypomobile L  Great Toe Mobility: Limited B/L - left much more limited       Precautions: Standard    Access Code: [email or web address]  URL: [email or web address]  Date: 2023  Prepared by: Aakash Mari    Exercises  - Seated Self Great Toe Mobilization  - 3 x daily - 7 x weekly - 1-5 minutes hold  - Standing Toe Dorsiflexion Stretch  - 3 x daily - 7 x weekly - 10 reps - 10 second hold  - Kneeling Ankle Dorsiflexion Self-Mobilization with Towel  - 3 x daily - 7 x weekly - 10 reps - 10 seconds hold  - Toe Yoga - Alternating Great Toe and Lesser Toe Extension  - 3 x daily - 7 x weekly - 3 sets - 10 reps    POC expires Unit limit Auth Expiration date PT/OT + Visit Limit?    2/13/24 N/A N/A 75 PCY                             Visit/Unit Tracking  AUTH Status:  Date 11/21             N/A Used 54              Remaining  21               Visit 1 IE    Manuals 11/21/23    Ankle PROM     Soft Tissue Deformation     EPAT     MFDc     GTE Mobs GJL    TCJ Mobs GJL    Education                    Assessment     Neuro Re-Ed     SLS     Bosu March     Sports Cord     3-way Slider     Blaze pod balance challenge     Jumping     Jump Stick Landing onto Abdon Rosales     Bosu lateral step overs     Leg Press HR (Flat, seat 8)     BFR HR     BFR Ankle DF/Inv     Toe Yoga HEP    Toe Splay     Toe Curls      Sciatic Nerve Glides     Femoral Nerve Glides     Tibial Nerve Glides                                   Ther Ex     Bike     TM/Curve     Side Stepping     Gastroc/Soleus Stretching     TCJ MWM HEP    HR     Foam Rolling      Toe Stretch Finger Between toes (Toe spacing) HEP    Self GTE mobs HEP              Ther Activity     Anti-Gravity Run/Jog     Hop progressions     Squatting     Step Ups     Step Downs     LSU     Dynamic W/u                         Gait Training               Modalities

## 2023-11-21 NOTE — PROGRESS NOTES
Tonja Jones M.D. Attending, Orthopaedic Surgery  Foot and [facility]      ORTHOPAEDIC FOOT AND ANKLE CLINIC VISIT     Assessment:     Encounter Diagnoses   Name Primary? Achilles tendon rupture, left, initial encounter     Pain in left foot     Encounter for lower extremity comparison imaging study     Turf toe of left foot Yes            Plan:   The patient verbalized understanding of exam findings and treatment plan. We engaged in the shared decision-making process and treatment options were discussed at length with the patient. Surgical and conservative management discussed today along with risks and benefits. Left plantar plate injury of the hallux  Left achilles rupture status post repair 1 year ago   Plan for taping of turf toe injury or splint with turf toe splint   Avoid aggravating activity  Rest and Ice as needed  Wear supportive shoes  Return in about 8 weeks (around 1/16/2024). History of Present Illness:   Chief Complaint:   Chief Complaint   Patient presents with    Left Ankle - Pain     About a year ago he was playing basket ball and hurt his ankle this is a second opinion      Sudheer Dorsey is a 26 y.o. male who is being seen for left foot pain. He is 1 year s/p left achilles tendon repair at [facility]. He mainly has trouble with weightbearing over the IP joint of the hallux Pain is localized at the IP joint of the left hallux with minimal radiating and described as sharp and severe. Patient denies numbness, tingling or radicular pain. Denies history of neuropathy. Patient does not smoke, does not have diabetes and does not take blood thinners. Patient denies family history of anesthesia complications and has not had any complications with anesthesia. He also complains of posterior tibial tendon pain and fatiguing quickly.      Pain/symptom timing:  Worse during the day when active  Pain/symptom context:  Worse with activites and work  Pain/symptom modifying factors:  Rest makes better, activities make worse  Pain/symptom associated signs/symptoms: none    Prior treatment   NSAIDsYes   Injections No   Bracing/Orthotics Yes    Physical Therapy Yes     Orthopedic Surgical History:   2022- Left achilles tendon repair    Past Medical, Surgical and Social History:  Past Medical History:  has no past medical history on file. Problem List: does not have a problem list on file. Past Surgical History:  has no past surgical history on file. Family History: family history is not on file. Social History:  reports that he has never smoked. He has never used smokeless tobacco. He reports that he does not currently use alcohol. He reports that he does not currently use drugs. Current Medications: has a current medication list which includes the following prescription(s): naproxen. Allergies: has No Known Allergies. Review of Systems:  General- denies fever/chills  HEENT- denies hearing loss or sore throat  Eyes- denies eye pain or visual disturbances, denies red eyes  Respiratory- denies cough or SOB  Cardio- denies chest pain or palpitations  GI- denies abdominal pain  Endocrine- denies urinary frequency  Urinary- denies pain with urination  Musculoskeletal- Negative except noted above  Skin- denies rashes or wounds  Neurological- denies dizziness or headache  Psychiatric- denies anxiety or difficulty concentrating    Physical Exam:   There were no vitals taken for this visit. General/Constitutional: No apparent distress: well-nourished and well developed.   Eyes: normal ocular motion  Cardio: RRR, Normal S1S2, No m/r/g  Lymphatic: No appreciable lymphadenopathy  Respiratory: Non-labored breathing, CTA b/l no w/c/r  Vascular: No edema, swelling or tenderness, except as noted in detailed exam.  Integumentary: No impressive skin lesions present, except as noted in detailed exam.  Neuro: No ataxia or tremors noted  Psych: Normal mood and affect, oriented to person, place and time. Appropriate affect. Musculoskeletal: Normal, except as noted in detailed exam and in HPI. Examination    Left    Gait Normal   Musculoskeletal Tender to palpation over the posterior tibial tendon and the IP joint of the hallux    Skin Normal.      Nails Normal    Range of Motion  20 degrees dorsiflexion, 40 degrees plantarflexion  Subtalar motion: normal    Stability Stable    Muscle Strength 5/5 tibialis anterior  5/5 gastrocnemius-soleus  5/5 posterior tibialis  5/5 peroneal/eversion strength  5/5 EHL  5/5 FHL    Neurologic Normal    Sensation Intact to light touch throughout sural, saphenous, superficial peroneal, deep peroneal and medial/lateral plantar nerve distributions. [place]-Denisse 5.07 filament (10g) testing  deferred. Cardiovascular Brisk capillary refill < 2 seconds,intact DP and PT pulses    Special Tests Negative aranda, able to single leg heel raise, no laxity but tenderness with vertical Lachman test to IP joint of hallux      Imaging Studies:   3 views of the right foot demonstrate no signs of acute fracture or dislocation          Tommy Cordero. Lachman, MD  Foot & Ankle Surgery   Department of [address] [place] [address] personally performed the service. Tommy Cordero.  Lachman, MD

## 2023-11-21 NOTE — PATIENT INSTRUCTIONS
Turf toe    Plantar plate injury to the hallux  Rest and activity modification  Supportive shoes  Toe taping or Turf toe splinting. How to Tape a Toe     Materials needed: 1" athletic tape and quick drying tape adhesive. For 1/2" tape, just split the 1" tape down the middle. 1) Place the foot on a table. 2) Apply one strip of tape about the midfoot. 3) Apply ½" strip about the toe. 4) Apply a figure 8 strip. Not too tight. 5) Repeat with 3 more strips. 6) Apply 1” strips about the bottom and side of toe, and overwrap as needed. All done!

## 2023-11-24 ENCOUNTER — OFFICE VISIT (OUTPATIENT)
Dept: PHYSICAL THERAPY | Facility: OTHER | Age: 23
End: 2023-11-24
Payer: COMMERCIAL

## 2023-11-24 DIAGNOSIS — M79.672 LEFT FOOT PAIN: ICD-10-CM

## 2023-11-24 DIAGNOSIS — S86.012D RUPTURE OF LEFT ACHILLES TENDON, SUBSEQUENT ENCOUNTER: Primary | ICD-10-CM

## 2023-11-24 PROCEDURE — 97112 NEUROMUSCULAR REEDUCATION: CPT

## 2023-11-24 PROCEDURE — 97140 MANUAL THERAPY 1/> REGIONS: CPT

## 2023-11-24 PROCEDURE — 97110 THERAPEUTIC EXERCISES: CPT

## 2023-11-24 NOTE — PROGRESS NOTES
Daily Note     Today's date: 2023  Patient name: Raj Watkins  : 2000  MRN: [id number]  Referring provider: Albert Paz  Dx:   Encounter Diagnosis     ICD-10-CM    1. Rupture of left Achilles tendon, subsequent encounter  S86.012D       2. Left foot pain  M79.672           Start Time: 0805  Stop Time: 2952  Total time in clinic (min): 53 minutes    Subjective: Patient reports he has been trying to keep up with his HEP. Objective: See treatment diary below      Assessment: Tolerated treatment well with continued focus on gentle mobility and motor control of foot intrinsics. Patient exhibited good technique with therapeutic exercises and would benefit from continued PT. Patient is heading back to school at [employer] to finish up the  and likely return over winter break for continued treatment. Plan: Continue per plan of care. Precautions: Standard    Access Code: [email or web address]  URL: [email or web address]  Date: 2023  Prepared by: Edwina Rollins    Exercises  - Seated Self Great Toe Mobilization  - 3 x daily - 7 x weekly - 1-5 minutes hold  - Standing Toe Dorsiflexion Stretch  - 3 x daily - 7 x weekly - 10 reps - 10 second hold  - Kneeling Ankle Dorsiflexion Self-Mobilization with Towel  - 3 x daily - 7 x weekly - 10 reps - 10 seconds hold  - Toe Yoga - Alternating Great Toe and Lesser Toe Extension  - 3 x daily - 7 x weekly - 3 sets - 10 reps    POC expires Unit limit Auth Expiration date PT/OT + Visit Limit?    24 N/A N/A 75 PCY                             Visit/Unit Tracking  AUTH Status:  Date             N/A Used 54 1             Remaining                Visit 1 IE 2   Manuals 23   Ankle PROM  GJL   Soft Tissue Deformation     EPAT     MFDc     GTE Mobs GJL GJL Gr III-IV   TCJ Mobs GJL GJL Gr III-IV   Education                    Assessment     Neuro Re-Ed     SLS  Airex cone  toe splay 5 rounds   Bosu March Sports Cord     3-way Slider     Blaze pod balance challenge     Jumping     Jump Stick Landing onto [employer] lateral step overs     Leg Press HR (Flat, seat 8)     BFR HR     BFR Ankle DF/Inv     Toe Yoga HEP Seated  3x10x5"    Standing 3x10x5"   Toe Splay  Seated 3x10x5"   Toe Curls      Sciatic Nerve Glides     Femoral Nerve Glides     Tibial Nerve Glides                                   Ther Ex     Bike  6' bloodflow   TM/Curve     Side Stepping     Gastroc/Soleus Stretching  Long sit towel stretch 3x30"   TCJ MWM HEP 10x10" HEP   HR     Foam Rolling      Toe Stretch Finger Between toes (Toe spacing) HEP Rev HEP   Self GTE mobs HEP Rev HEP             Ther Activity     Anti-Gravity Run/Jog     Hop progressions     Squatting     Step Ups     Step Downs     LSU     Dynamic W/u                         Gait Training               Modalities

## 2023-12-06 ENCOUNTER — TELEPHONE (OUTPATIENT)
Age: 23
End: 2023-12-06

## 2023-12-06 NOTE — TELEPHONE ENCOUNTER
I received a Care Request from patient to schedule for:    Where Does it Hurt? Forearm   Are you considering joint replacement? No   Are you seeking a second opinion? No  If yes, who is your doctor? I attempted to lvm to [address] [place] [address] at [phone].

## 2023-12-19 ENCOUNTER — OFFICE VISIT (OUTPATIENT)
Dept: PHYSICAL THERAPY | Facility: OTHER | Age: 23
End: 2023-12-19
Payer: COMMERCIAL

## 2023-12-19 DIAGNOSIS — M79.672 LEFT FOOT PAIN: ICD-10-CM

## 2023-12-19 DIAGNOSIS — S86.012D RUPTURE OF LEFT ACHILLES TENDON, SUBSEQUENT ENCOUNTER: Primary | ICD-10-CM

## 2023-12-19 PROCEDURE — 97140 MANUAL THERAPY 1/> REGIONS: CPT

## 2023-12-19 PROCEDURE — 97110 THERAPEUTIC EXERCISES: CPT

## 2023-12-19 PROCEDURE — 97112 NEUROMUSCULAR REEDUCATION: CPT

## 2023-12-19 NOTE — PROGRESS NOTES
Daily Note     Today's date: 2023  Patient name: Ryan Small  : 2000  MRN: [id number]  Referring provider: Larry Constantino  Dx:   Encounter Diagnosis     ICD-10-CM    1. Rupture of left Achilles tendon, subsequent encounter  S86.012D       2. Left foot pain  M79.672           Start Time: 1030  Stop Time: 1140  Total time in clinic (min): 70 minutes  [facility] PT 1 on 1 from 2612-0029 (38 minutes total)    Subjective: Patient reports he has been doing better with his left foot pain with only pain if he really forced end range toe extension. Patient still reports pain along posterior tibialis.       Objective: See treatment diary below      Assessment: Tolerated treatment well with continued focus on mobility and motor control of foot intrinsics in NWB and WB positions. Patient exhibited good technique with therapeutic exercises and would benefit from continued PT. Patient demonstrating excellent improvements in mobility and motor control - likely progress/load in future visit.       Plan: Continue per plan of care.      Precautions: Standard    Access Code: [email or web address]  URL: [email or web address]  Date: 2023  Prepared by: Loyd Welch    Exercises  - Seated Self Great Toe Mobilization  - 3 x daily - 7 x weekly - 1-5 minutes hold  - Standing Toe Dorsiflexion Stretch  - 3 x daily - 7 x weekly - 10 reps - 10 second hold  - Kneeling Ankle Dorsiflexion Self-Mobilization with Towel  - 3 x daily - 7 x weekly - 10 reps - 10 seconds hold  - Toe Yoga - Alternating Great Toe and Lesser Toe Extension  - 3 x daily - 7 x weekly - 3 sets - 10 reps    POC expires Unit limit Auth Expiration date PT/OT + Visit Limit?   24 N/A N/A 75 PCY                             Visit/Unit Tracking  AUTH Status:  Date            N/A Used 54 1 1            Remaining               Visit 1 IE 2 3   Manuals 23   Ankle PROM  GJL GJL   Soft Tissue Deformation      EPAT     "  MFDc   GJL post tib    GTE Mobs GJL GJL Gr III-IV GJL Gr III-IV   TCJ Mobs GJL GJL Gr III-IV GJL Gr III-IV   Education                        Assessment      Neuro Re-Ed      SLS  Airex cone  toe splay 5 rounds Airex cone  toe splay 3x5 rounds   Bosu March      Sports Cord      3-way Slider      Blaze pod balance challenge      Jumping      Jump Stick Landing onto Bosu      Bosu lateral step overs      Leg Press HR (Flat, seat 8)      BFR HR      BFR Ankle DF/Inv      Toe Yoga HEP Seated  3x10x5\"    Standing 3x10x5\" Seated  3x10x5\"    Standing 3x10x5\"   Toe Splay  Seated 3x10x5\" Seated 3x10x5\"   Toe Curls       Sciatic Nerve Glides      Femoral Nerve Glides      Tibial Nerve Glides      Banded DF following TCJ  mobs/MWM   Blue TB 30x5\"                                 Ther Ex      Bike  6' bloodflow 6' bloodflow   TM/Curve      Side Stepping      Gastroc/Soleus Stretching  Long sit towel stretch 3x30\"    TCJ MWM HEP 10x10\" HEP 10x10\"    HR      Foam Rolling       Toe Stretch Finger Between toes (Toe spacing) HEP Rev HEP    Self GTE mobs HEP Rev HEP Stand GTE stretch 10x10\"               Ther Activity      Anti-Gravity Run/Jog      Hop progressions      Squatting      Step Ups      Step Downs      LSU      Dynamic W/u                              Gait Training                  Modalities                           "

## 2023-12-21 ENCOUNTER — OFFICE VISIT (OUTPATIENT)
Dept: PHYSICAL THERAPY | Facility: OTHER | Age: 23
End: 2023-12-21
Payer: COMMERCIAL

## 2023-12-21 DIAGNOSIS — S86.012D RUPTURE OF LEFT ACHILLES TENDON, SUBSEQUENT ENCOUNTER: Primary | ICD-10-CM

## 2023-12-21 DIAGNOSIS — M79.672 LEFT FOOT PAIN: ICD-10-CM

## 2023-12-21 PROCEDURE — 97140 MANUAL THERAPY 1/> REGIONS: CPT

## 2023-12-21 PROCEDURE — 97112 NEUROMUSCULAR REEDUCATION: CPT

## 2023-12-21 PROCEDURE — 97110 THERAPEUTIC EXERCISES: CPT

## 2023-12-21 NOTE — PROGRESS NOTES
Daily Note     Today's date: 2023  Patient name: Ryan Small  : 2000  MRN: [id number]  Referring provider: Larry Constantino  Dx:   Encounter Diagnosis     ICD-10-CM    1. Rupture of left Achilles tendon, subsequent encounter  S86.012D       2. Left foot pain  M79.672           Start Time: 1405  Stop Time: 1505  Total time in clinic (min): 60 minutes  [facility] PT 1 on 1 from 6805-3482 (53 minutes total)      Subjective: Patient reports no major pain or soreness after last visit.       Objective: See treatment diary below      Assessment: Tolerated treatment well with continued focus on mobility and motor control of foot intrinsics in NWB and WB positions. No adverse reactions to treatment. Patient exhibited good technique with therapeutic exercises and would benefit from continued PT. Patient demonstrating continued improvements in mobility and motor control, especially with loading in bear crawl position.       Plan: Continue per plan of care.      Precautions: Standard    Access Code: [email or web address]  URL: [email or web address]  Date: 2023  Prepared by: Loyd Welch    Exercises  - Seated Self Great Toe Mobilization  - 3 x daily - 7 x weekly - 1-5 minutes hold  - Standing Toe Dorsiflexion Stretch  - 3 x daily - 7 x weekly - 10 reps - 10 second hold  - Kneeling Ankle Dorsiflexion Self-Mobilization with Towel  - 3 x daily - 7 x weekly - 10 reps - 10 seconds hold  - Toe Yoga - Alternating Great Toe and Lesser Toe Extension  - 3 x daily - 7 x weekly - 3 sets - 10 reps    POC expires Unit limit Auth Expiration date PT/OT + Visit Limit?   24 N/A N/A 75 PCY                             Visit/Unit Tracking  AUTH Status:  Date           N/A Used 54 1 1 1           Remaining   18            Visit 1 IE 2 3 4    Manuals 23    Ankle PROM  GJL GJL GJL    Soft Tissue Deformation    GJL gastroc/ soleus    EPAT        MFDc   GJL post tib GJL  "post tib with nerve glides    GTE Mobs GJL GJL Gr III-IV GJL Gr III-IV GJL Gr III-IV    TCJ Mobs GJL GJL Gr III-IV GJL Gr III-IV GJL Gr III-IV    Stretch    GJL PF stretch    Education                                        Assessment        Neuro Re-Ed        SLS  Airex cone  toe splay 5 rounds Airex cone  toe splay 3x5 rounds Airex cone  toe splay 3x5 rounds    Bosu March        Sports Cord        3-way Slider        Blaze pod balance challenge        Jumping        Jump Stick Landing onto Bosu        Bosu lateral step overs        Leg Press HR (Flat, seat 8)        BFR HR        BFR Ankle DF/Inv        Toe Yoga HEP Seated  3x10x5\"    Standing 3x10x5\" Seated  3x10x5\"    Standing 3x10x5\" Seated  3x10x5\"    Standing 3x10x5\"    Toe Splay  Seated 3x10x5\" Seated 3x10x5\" Seated 3x10x5\"    Toe Curls         Sciatic Nerve Glides    3x10 with MFDc    Femoral Nerve Glides        Tibial Nerve Glides        Banded DF following TCJ  mobs/MWM   Blue TB 30x5\" Tight OTB 30x5\"                                            Ther Ex        Bike  6' bloodflow 6' bloodflow 5' bloodflow    TM/Curve        Side Stepping        Gastroc/Soleus Stretching  Long sit towel stretch 3x30\"      TCJ MWM HEP 10x10\" HEP 10x10\"  10x10\"    HR        Foam Rolling         Toe Stretch Finger Between toes (Toe spacing) HEP Rev HEP      Self GTE mobs HEP Rev HEP Stand GTE stretch 10x10\" Stand GTE stretch 10x10\"    Bear crawl toe/calf stretch weight shift    3x10x5\"            Ther Activity        Anti-Gravity Run/Jog        Hop progressions        Squatting        Step Ups        Step Downs        LSU        Dynamic W/u                                        Gait Training                        Modalities                                 "

## 2023-12-26 ENCOUNTER — OFFICE VISIT (OUTPATIENT)
Dept: PHYSICAL THERAPY | Facility: OTHER | Age: 23
End: 2023-12-26
Payer: COMMERCIAL

## 2023-12-26 DIAGNOSIS — M79.672 LEFT FOOT PAIN: ICD-10-CM

## 2023-12-26 DIAGNOSIS — S86.012D RUPTURE OF LEFT ACHILLES TENDON, SUBSEQUENT ENCOUNTER: Primary | ICD-10-CM

## 2023-12-26 PROCEDURE — 97112 NEUROMUSCULAR REEDUCATION: CPT

## 2023-12-26 PROCEDURE — 97140 MANUAL THERAPY 1/> REGIONS: CPT

## 2023-12-26 NOTE — PROGRESS NOTES
Daily Note     Today's date: 2023  Patient name: Ryan Small  : 2000  MRN: [id number]  Referring provider: Larry Constantino  Dx:   Encounter Diagnosis     ICD-10-CM    1. Rupture of left Achilles tendon, subsequent encounter  S86.012D       2. Left foot pain  M79.672           Start Time: 1000  Stop Time: 1105  Total time in clinic (min): 65 minutes  [facility] PT 1 on 1 from 0777-0185 and 9730-3475 (25 minutes total)    Subjective: Patient reports his symptoms have been improving.       Objective: See treatment diary below      Assessment: Tolerated treatment well with continued focus on mobility and motor control of foot intrinsics in NWB and WB positions. No adverse reactions to treatment - pain free throughout session. Patient exhibited good technique with therapeutic exercises and would benefit from continued PT.       Plan: Continue per plan of care.      Precautions: Standard    Access Code: [email or web address]  URL: [email or web address]  Date: 2023  Prepared by: Loyd Welch    Exercises  - Seated Self Great Toe Mobilization  - 3 x daily - 7 x weekly - 1-5 minutes hold  - Standing Toe Dorsiflexion Stretch  - 3 x daily - 7 x weekly - 10 reps - 10 second hold  - Kneeling Ankle Dorsiflexion Self-Mobilization with Towel  - 3 x daily - 7 x weekly - 10 reps - 10 seconds hold  - Toe Yoga - Alternating Great Toe and Lesser Toe Extension  - 3 x daily - 7 x weekly - 3 sets - 10 reps    POC expires Unit limit Auth Expiration date PT/OT + Visit Limit?   24 N/A N/A 75 PCY                             Visit/Unit Tracking  AUTH Status:  Date 11/21 11/24 12/19 12/21 12/26         N/A Used 54 1 1 1 1          Remaining   19 18 17           Visit 1 IE 2 3 4 5   Manuals 23   Ankle PROM  GJL GJL GJL GJL   Soft Tissue Deformation    GJL gastroc/ soleus GJL gastroc/  soleus   EPAT        MFDc   GJL post tib GJL post tib with nerve glides GJL post tib with nerve  "glides   GTE Mobs GJL GJL Gr III-IV GJL Gr III-IV GJL Gr III-IV GJL Gr III-IV   TCJ Mobs GJL GJL Gr III-IV GJL Gr III-IV GJL Gr III-IV GJL Gr III-IV   Stretch    GJL PF stretch GJL PF stretch   Education                                        Assessment        Neuro Re-Ed        SLS  Airex cone  toe splay 5 rounds Airex cone  toe splay 3x5 rounds Airex cone  toe splay 3x5 rounds Airex cone  toe splay 3x5 rounds   Bosu March        Sports Cord        3-way Slider     With ankle DF slant board 3x5   Blaze pod balance challenge        Jumping        Jump Stick Landing onto Bosu        Bosu lateral step overs        Leg Press HR (Flat, seat 8)        BFR HR        BFR Ankle DF/Inv        Toe Yoga HEP Seated  3x10x5\"    Standing 3x10x5\" Seated  3x10x5\"    Standing 3x10x5\" Seated  3x10x5\"    Standing 3x10x5\" Seated  3x10x5\"    Standing 3x10x5\"   Toe Splay  Seated 3x10x5\" Seated 3x10x5\" Seated 3x10x5\" Seated 3x10x5\"   Toe Curls         Sciatic Nerve Glides    3x10 with MFDc 3x10 with MFDc   Femoral Nerve Glides        Tibial Nerve Glides        Banded DF following TCJ  mobs/MWM   Blue TB 30x5\" Tight OTB 30x5\" Tight OTB 30x5\"   Great to flexion band     OTB 3x10                                   Ther Ex        Bike  6' bloodflow 6' bloodflow 5' bloodflow 5' bloodflow   TM/Curve        Side Stepping        Gastroc/Soleus Stretching  Long sit towel stretch 3x30\"      TCJ MWM HEP 10x10\" HEP 10x10\"  10x10\" 10x10\"   HR        Foam Rolling         Toe Stretch Finger Between toes (Toe spacing) HEP Rev HEP      Self GTE mobs HEP Rev HEP Stand GTE stretch 10x10\" Stand GTE stretch 10x10\" Stand GTE stretch 10x10\"   Bear crawl toe/calf stretch weight shift    3x10x5\" 3x10x5\"           Ther Activity        Anti-Gravity Run/Jog        Hop progressions        Squatting        Step Ups        Step Downs        LSU        Dynamic W/u                                        Gait Training                      "   Modalities

## 2023-12-28 ENCOUNTER — APPOINTMENT (OUTPATIENT)
Dept: PHYSICAL THERAPY | Facility: OTHER | Age: 23
End: 2023-12-28
Payer: COMMERCIAL

## 2024-01-03 ENCOUNTER — OFFICE VISIT (OUTPATIENT)
Dept: PHYSICAL THERAPY | Facility: OTHER | Age: 24
End: 2024-01-03
Payer: COMMERCIAL

## 2024-01-03 ENCOUNTER — OFFICE VISIT (OUTPATIENT)
Dept: OBGYN CLINIC | Facility: CLINIC | Age: 24
End: 2024-01-03
Payer: COMMERCIAL

## 2024-01-03 VITALS
DIASTOLIC BLOOD PRESSURE: 84 MMHG | HEART RATE: 81 BPM | BODY MASS INDEX: 27.25 KG/M2 | SYSTOLIC BLOOD PRESSURE: 144 MMHG | WEIGHT: 201.2 LBS | HEIGHT: 72 IN

## 2024-01-03 DIAGNOSIS — S93.522A TURF TOE OF LEFT FOOT: Primary | ICD-10-CM

## 2024-01-03 DIAGNOSIS — M79.672 LEFT FOOT PAIN: ICD-10-CM

## 2024-01-03 DIAGNOSIS — S86.012D RUPTURE OF LEFT ACHILLES TENDON, SUBSEQUENT ENCOUNTER: Primary | ICD-10-CM

## 2024-01-03 DIAGNOSIS — S86.892A LEFT MEDIAL TIBIAL STRESS SYNDROME, INITIAL ENCOUNTER: ICD-10-CM

## 2024-01-03 PROCEDURE — 97110 THERAPEUTIC EXERCISES: CPT

## 2024-01-03 PROCEDURE — 97112 NEUROMUSCULAR REEDUCATION: CPT

## 2024-01-03 PROCEDURE — 97140 MANUAL THERAPY 1/> REGIONS: CPT

## 2024-01-03 PROCEDURE — 99213 OFFICE O/P EST LOW 20 MIN: CPT | Performed by: ORTHOPAEDIC SURGERY

## 2024-01-03 NOTE — PROGRESS NOTES
James R Lachman, M.D.  Attending, Orthopaedic Surgery  Foot and Ankle  [facility]      ORTHOPAEDIC FOOT AND ANKLE CLINIC VISIT     Assessment:     Encounter Diagnoses   Name Primary?    Turf toe of left foot Yes    Left medial tibial stress syndrome, initial encounter         Plan:   The patient verbalized understanding of exam findings and treatment plan. We engaged in the shared decision-making process and treatment options were discussed at length with the patient. Surgical and conservative management discussed today along with risks and benefits.  He has seen improvements in the turf toe symptoms with the carbon fiber foot plate, supportive shoes, toe taping and splinting. He has not returned to sport yet.  No restrictions from an orthopaedic standpoint.  He may return to all activities gradually as tolerated.  In reference to the the Medial tibial periostitis I recommend a course of physical therapy.    Followup PRN      History of Present Illness:   Chief Complaint:   Chief Complaint   Patient presents with    Left Ankle - Follow-up     Ryan Small is a 23 y.o. male who is being seen in follow-up for left turf toe. When we last saw he we recommended tapping, carbon fiber plate and physical therapy.  Pain has improved. Residual pain is localized at medial tibia with minimal radiating and described as sharp and severe.   Patient reports he has not been able to return to sports due to the shin pain.       Pain/symptom timing:  Worse during the day when active  Pain/symptom context:  Worse with activites and work  Pain/symptom modifying factors:  Rest makes better, activities make worse  Pain/symptom associated signs/symptoms: none    Prior treatment   NSAIDsYes   Injections No   Bracing/Orthotics Yes    Physical Therapy Yes     Orthopedic Surgical History:   Left achilles repair 10/26/22 at [facility]    Past Medical, Surgical and Social History:  Past Medical History:  has no  past medical history on file.  Problem List: does not have any pertinent problems on file.  Past Surgical History:  has no past surgical history on file.  Family History: family history is not on file.  Social History:  reports that he has never smoked. He has never used smokeless tobacco. He reports that he does not currently use alcohol. He reports that he does not currently use drugs.  Current Medications: has a current medication list which includes the following prescription(s): naproxen.  Allergies: has No Known Allergies.     Review of Systems:  General- denies fever/chills  HEENT- denies hearing loss or sore throat  Eyes- denies eye pain or visual disturbances, denies red eyes  Respiratory- denies cough or SOB  Cardio- denies chest pain or palpitations  GI- denies abdominal pain  Endocrine- denies urinary frequency  Urinary- denies pain with urination  Musculoskeletal- Negative except noted above  Skin- denies rashes or wounds  Neurological- denies dizziness or headache  Psychiatric- denies anxiety or difficulty concentrating    Physical Exam:   /84   Pulse 81   Ht 6' (1.829 m)   Wt 91.3 kg (201 lb 3.2 oz)   BMI 27.29 kg/m²   General/Constitutional: No apparent distress: well-nourished and well developed.  Eyes: normal ocular motion  Cardio: RRR, Normal S1S2, No m/r/g  Lymphatic: No appreciable lymphadenopathy  Respiratory: Non-labored breathing, CTA b/l no w/c/r  Vascular: No edema, swelling or tenderness, except as noted in detailed exam.  Integumentary: No impressive skin lesions present, except as noted in detailed exam.  Neuro: No ataxia or tremors noted  Psych: Normal mood and affect, oriented to person, place and time. Appropriate affect.  Musculoskeletal: Normal, except as noted in detailed exam and in HPI.    Examination    Left    Gait Normal   Musculoskeletal Tender to palpation at medial tibia, NTTP IP hallux    Skin Normal.      Nails Normal    Range of Motion  20 degrees dorsiflexion,  40 degrees plantarflexion  Subtalar motion: normal    Stability Stable    Muscle Strength 5/5 tibialis anterior  5/5 gastrocnemius-soleus  5/5 posterior tibialis  5/5 peroneal/eversion strength  5/5 EHL  5/5 FHL    Neurologic Normal    Sensation Intact to light touch throughout sural, saphenous, superficial peroneal, deep peroneal and medial/lateral plantar nerve distributions.  [place] 5.07 filament (10g) testing  deferred.    Cardiovascular Brisk capillary refill < 2 seconds,intact DP and PT pulses    Special Tests None      Imaging Studies:   No new imaging         James R. Lachman, MD  Foot & Ankle Surgery   Department of Orthopaedic Surgery  [facility]      I personally performed the service.    James R. Lachman, MD     Scribe Attestation      I,:  Ana Barnes am acting as a scribe while in the presence of the attending physician.:       I,:  James R Lachman, MD personally performed the services described in this documentation    as scribed in my presence.:

## 2024-01-03 NOTE — PROGRESS NOTES
Daily Note     Today's date: 1/3/2024  Patient name: Ryan Small  : 2000  MRN: [id number]  Referring provider: Larry Constantino  Dx:   Encounter Diagnosis     ICD-10-CM    1. Rupture of left Achilles tendon, subsequent encounter  S86.012D       2. Left foot pain  M79.672           Start Time: 1530  Stop Time: 1630  Total time in clinic (min): 60 minutes      Subjective: Patient reports he will be heading back to [employer] for the Spring semester this  and will continue PT while back at school.       Objective: See treatment diary below      Assessment: Tolerated treatment well with continued focus on mobility and motor control of foot intrinsics. Patient exhibited good technique with therapeutic exercises and would benefit from continued PT.       Plan: Continue per plan of care.  Patient to be seen at PT close to college and follow-up with us in future as needed.      Precautions: Standard    Access Code: [email or web address]  URL: [email or web address]  Date: 2023  Prepared by: Loyd Welch    Exercises  - Seated Self Great Toe Mobilization  - 3 x daily - 7 x weekly - 1-5 minutes hold  - Standing Toe Dorsiflexion Stretch  - 3 x daily - 7 x weekly - 10 reps - 10 second hold  - Kneeling Ankle Dorsiflexion Self-Mobilization with Towel  - 3 x daily - 7 x weekly - 10 reps - 10 seconds hold  - Toe Yoga - Alternating Great Toe and Lesser Toe Extension  - 3 x daily - 7 x weekly - 3 sets - 10 reps    POC expires Unit limit Auth Expiration date PT/OT + Visit Limit?   24 N/A N/A 75 PCY                             Visit/Unit Tracking  AUTH Status:  Date 11/21 11/24 12/19 12/21 12/26 1/3/24        N/A Used 54 1 1 1 1 1         Remaining  21 20 19 18 17 16          Visit 1 IE 2 3 4 5 6    Manuals 11/21/23 11/24/23 12/19/23 12/21/23 12/26/23 1/3/24    Ankle PROM  GJL GJL GJL GJL GJL    Soft Tissue Deformation    GJL gastroc/ soleus GJL gastroc/  soleus     EPAT          MFDc   GJL post tib GJL post  "tib with nerve glides GJL post tib with nerve glides GJL post tib with nerve glides    GTE Mobs GJL GJL Gr III-IV GJL Gr III-IV GJL Gr III-IV GJL Gr III-IV GJL Gr III-IV    TCJ Mobs GJL GJL Gr III-IV GJL Gr III-IV GJL Gr III-IV GJL Gr III-IV GJL Gr III-IV    Stretch    GJL PF stretch GJL PF stretch GJL PF stretch    Education                                                  Assessment          Neuro Re-Ed          SLS  Airex cone  toe splay 5 rounds Airex cone  toe splay 3x5 rounds Airex cone  toe splay 3x5 rounds Airex cone  toe splay 3x5 rounds Airex cone  toe splay 3x5 rounds    Bosu March          Sports Cord          3-way Slider     With ankle DF slant board 3x5     Blaze pod balance challenge          Jumping          Jump Stick Landing onto Bosu          Bosu lateral step overs          Leg Press HR (Flat, seat 8)          BFR HR          BFR Ankle DF/Inv          Toe Yoga HEP Seated  3x10x5\"    Standing 3x10x5\" Seated  3x10x5\"    Standing 3x10x5\" Seated  3x10x5\"    Standing 3x10x5\" Seated  3x10x5\"    Standing 3x10x5\" Standing 3x10x5\"    Toe Splay  Seated 3x10x5\" Seated 3x10x5\" Seated 3x10x5\" Seated 3x10x5\" Standing 3x10x5\"    Toe Curls           Sciatic Nerve Glides    3x10 with MFDc 3x10 with MFDc 3x10 with MFDc    Femoral Nerve Glides          Tibial Nerve Glides          Banded DF following TCJ  mobs/MWM   Blue TB 30x5\" Tight OTB 30x5\" Tight OTB 30x5\" Plum TB 30x5\"    Great to flexion band     OTB 3x10 OTB 3x10                                            Ther Ex          Bike  6' bloodflow 6' bloodflow 5' bloodflow 5' bloodflow     TM/Curve          Side Stepping          Gastroc/Soleus Stretching  Long sit towel stretch 3x30\"        TCJ MWM HEP 10x10\" HEP 10x10\"  10x10\" 10x10\" 10x10\"    HR          Foam Rolling           Toe Stretch Finger Between toes (Toe spacing) HEP Rev HEP        Self GTE mobs HEP Rev HEP Stand GTE stretch 10x10\" Stand GTE stretch 10x10\" Stand GTE " "stretch 10x10\" Stand GTE stretch 10x10\"    Bear crawl toe/calf stretch weight shift    3x10x5\" 3x10x5\"               Ther Activity          Anti-Gravity Run/Jog          Hop progressions          Squatting          Step Ups          Step Downs          LSU          Dynamic W/u                                                  Gait Training                              Modalities                                       "

## 2024-06-04 ENCOUNTER — HOSPITAL ENCOUNTER (OUTPATIENT)
Dept: ULTRASOUND IMAGING | Facility: HOSPITAL | Age: 24
Discharge: HOME | End: 2024-06-04
Attending: STUDENT IN AN ORGANIZED HEALTH CARE EDUCATION/TRAINING PROGRAM
Payer: COMMERCIAL

## 2024-06-04 DIAGNOSIS — R59.9 LYMPH NODE ENLARGEMENT: ICD-10-CM

## 2024-06-04 PROCEDURE — 76536 US EXAM OF HEAD AND NECK: CPT

## 2024-06-17 ENCOUNTER — TELEPHONE (OUTPATIENT)
Dept: OTOLARYNGOLOGY | Facility: CLINIC | Age: 24
End: 2024-06-17

## 2024-06-17 NOTE — TELEPHONE ENCOUNTER
Called the patient   Explained to him the US results  He told me his LN shrink ed to the normal size  Instructed to schedule a follow up appointment in few weeks for check

## 2024-06-24 ENCOUNTER — OFFICE VISIT (OUTPATIENT)
Dept: OBGYN CLINIC | Facility: CLINIC | Age: 24
End: 2024-06-24
Payer: COMMERCIAL

## 2024-06-24 ENCOUNTER — APPOINTMENT (OUTPATIENT)
Dept: RADIOLOGY | Facility: AMBULARY SURGERY CENTER | Age: 24
End: 2024-06-24
Attending: ORTHOPAEDIC SURGERY
Payer: COMMERCIAL

## 2024-06-24 VITALS — BODY MASS INDEX: 27.36 KG/M2 | HEIGHT: 72 IN | WEIGHT: 202 LBS

## 2024-06-24 DIAGNOSIS — M75.21 BICEPS TENDONITIS ON RIGHT: ICD-10-CM

## 2024-06-24 DIAGNOSIS — M79.631 RIGHT FOREARM PAIN: ICD-10-CM

## 2024-06-24 DIAGNOSIS — M79.631 RIGHT FOREARM PAIN: Primary | ICD-10-CM

## 2024-06-24 PROCEDURE — 73090 X-RAY EXAM OF FOREARM: CPT

## 2024-06-24 PROCEDURE — 99214 OFFICE O/P EST MOD 30 MIN: CPT | Performed by: ORTHOPAEDIC SURGERY

## 2024-06-24 NOTE — PROGRESS NOTES
Assessment:  1. Right forearm pain  XR forearm 2 vw right    MRI elbow right wo contrast      2. Biceps tendonitis on right  MRI elbow right wo contrast        Patient Active Problem List   Diagnosis    Turf toe of left foot           Plan      23 year old male with chronic insertional bicep tendonosis with PIN irritation  Diagnostics reviewed and physical exam performed.  Diagnosis, treatment options and associated risks were discussed with the patient including no treatment, nonsurgical treatment and potential for surgical intervention.  The patient was given the opportunity to ask questions regarding each.   Order placed today for MRI of right elbow to further evaluate for tendonosis of bicep, brachialis, stress reaction of ulnar shaft  In the interim, avoid all elbow flexion lifting activities  Discussed possible US guided PRP injection   May use ice or heat as needed for pain relief  May take NSAIDs/Tylenol as needed for pain control  Follow up after MRI        Subjective:     Patient ID:    Chief Complaint:Ryan Small 23 y.o. male      HPI    Patient comes in today for initial evaluation of right forearm pain that has been ongoing for at least one year with no obvious mechanism of injury, trauma, or inciting event. This pain was initially bilateral and sharp and stabbing in nature, however over time the left forearm has completely resolved, and right forearm pain has persisted. He notes pain is aggravated with elbow flexion activities such as curls, releasing the barbell after barbell rows. He has completed home exercises with minimal relief. He does not take pain medication. Denies paraesthesias, mechanical symptoms. Denies previous injury, surgery, trauma to the elbow, forearm, wrist.       The following portions of the patient's history were reviewed and updated as appropriate: allergies, current medications, past family history, past social history, past surgical history and problem  list.        Social History     Socioeconomic History    Marital status: Single     Spouse name: Not on file    Number of children: Not on file    Years of education: Not on file    Highest education level: Not on file   Occupational History    Not on file   Tobacco Use    Smoking status: Never    Smokeless tobacco: Never   Vaping Use    Vaping status: Never Used   Substance and Sexual Activity    Alcohol use: Not Currently    Drug use: Not Currently    Sexual activity: Yes   Other Topics Concern    Not on file   Social History Narrative    Not on file     Social Determinants of Health     Financial Resource Strain: Not on file   Food Insecurity: Not on file   Transportation Needs: Not on file   Physical Activity: Not on file   Stress: Not on file   Social Connections: Not on file   Intimate Partner Violence: Not on file   Housing Stability: Not on file     History reviewed. No pertinent past medical history.  History reviewed. No pertinent surgical history.  No Known Allergies  Current Outpatient Medications on File Prior to Visit   Medication Sig Dispense Refill    naproxen (NAPROSYN) 500 mg tablet TAKE 1 TABLET BY MOUTH TWICE A DAY FOR 14 DAYS WITH FOOD -AS NEEDED FOR INFLAMMATION (Patient not taking: Reported on 1/3/2024)       No current facility-administered medications on file prior to visit.              Objective:    Review of Systems   Constitutional:  Negative for chills and fever.   HENT:  Negative for ear pain and sore throat.    Eyes:  Negative for pain and visual disturbance.   Respiratory:  Negative for cough and shortness of breath.    Cardiovascular:  Negative for chest pain and palpitations.   Gastrointestinal:  Negative for abdominal pain and vomiting.   Genitourinary:  Negative for dysuria and hematuria.   Musculoskeletal:  Negative for arthralgias and back pain.   Skin:  Negative for color change and rash.   Neurological:  Negative for seizures and syncope.   All other systems reviewed and are  "negative.      Right Elbow Exam     Comments:    MMT wrist flexion, extension, pronation, supination 5/5 and pain free  ROM wrist WNL, elbow WNL no pain throughout range  Pain with MMT of bicep and brachialis            Physical Exam  Constitutional:       Appearance: Normal appearance.   HENT:      Head: Normocephalic and atraumatic.   Eyes:      General: No scleral icterus.     Extraocular Movements: Extraocular movements intact.      Conjunctiva/sclera: Conjunctivae normal.   Cardiovascular:      Rate and Rhythm: Normal rate.   Pulmonary:      Effort: Pulmonary effort is normal. No respiratory distress.   Musculoskeletal:      Cervical back: Normal range of motion and neck supple.      Comments: See Ortho Exam   Skin:     General: Skin is warm and dry.   Neurological:      General: No focal deficit present.      Mental Status: He is alert and oriented to person, place, and time.   Psychiatric:         Mood and Affect: Mood normal.         Behavior: Behavior normal.         Procedures  No Procedures performed today    I have personally reviewed pertinent films in PACS and my interpretation is no acute osseous abnormalities .      Scribe Attestation      I,:  Marcy Miramontes am acting as a scribe while in the presence of the attending physician.:       I,:  Guerrero Kennedy DO personally performed the services described in this documentation    as scribed in my presence.:               Portions of the record may have been created with voice recognition software.  Occasional wrong word or \"sound a like\" substitutions may have occurred due to the inherent limitations of voice recognition software.  Read the chart carefully and recognize, using context, where substitutions have occurred.  "

## 2024-07-02 ENCOUNTER — HOSPITAL ENCOUNTER (OUTPATIENT)
Dept: MRI IMAGING | Facility: HOSPITAL | Age: 24
Discharge: HOME | End: 2024-07-02
Attending: ORTHOPAEDIC SURGERY
Payer: COMMERCIAL

## 2024-07-02 DIAGNOSIS — M79.631 RIGHT FOREARM PAIN: ICD-10-CM

## 2024-07-02 DIAGNOSIS — M75.21 BICEPS TENDONITIS ON RIGHT: ICD-10-CM

## 2024-07-02 PROCEDURE — 73221 MRI JOINT UPR EXTREM W/O DYE: CPT

## 2024-07-11 ENCOUNTER — OFFICE VISIT (OUTPATIENT)
Dept: OBGYN CLINIC | Facility: CLINIC | Age: 24
End: 2024-07-11
Payer: COMMERCIAL

## 2024-07-11 VITALS
SYSTOLIC BLOOD PRESSURE: 122 MMHG | HEIGHT: 72 IN | DIASTOLIC BLOOD PRESSURE: 73 MMHG | HEART RATE: 82 BPM | BODY MASS INDEX: 27.5 KG/M2 | WEIGHT: 203 LBS

## 2024-07-11 DIAGNOSIS — M25.521 ELBOW PAIN, CHRONIC, RIGHT: Primary | ICD-10-CM

## 2024-07-11 DIAGNOSIS — G89.29 ELBOW PAIN, CHRONIC, RIGHT: Primary | ICD-10-CM

## 2024-07-11 PROCEDURE — 99212 OFFICE O/P EST SF 10 MIN: CPT | Performed by: ORTHOPAEDIC SURGERY

## 2024-07-11 NOTE — PROGRESS NOTES
Assessment:  1. Insertional biceps tendinosis          Patient Active Problem List   Diagnosis    Turf toe of left foot           Plan      I did have a lengthy discussion with him he wants to work out he feels that it is a good stress reliever.  We are going to reach out to Dr. Hays to see if he is willing and able to give a PRP injection into the tendon here.  I did tell him that it can take weeks to months to totally resolve this because it is repetitive irritation.  But if the PRP is done it may accelerate the healing process.            Subjective:     Patient ID:    Chief Complaint:Ryan Small 24 y.o. male      HPI    Patient comes in today with regards to his right elbow.  He had been sent for an MRI for suspicion for tendinosis of the biceps tendon.  He comes back to review the MRI.      The following portions of the patient's history were reviewed and updated as appropriate: allergies, current medications, past family history, past social history, past surgical history and problem list.    All organ systems normal    Social History     Socioeconomic History    Marital status: Single     Spouse name: Not on file    Number of children: Not on file    Years of education: Not on file    Highest education level: Not on file   Occupational History    Not on file   Tobacco Use    Smoking status: Never    Smokeless tobacco: Never   Vaping Use    Vaping status: Never Used   Substance and Sexual Activity    Alcohol use: Not Currently    Drug use: Not Currently    Sexual activity: Yes   Other Topics Concern    Not on file   Social History Narrative    Not on file     Social Determinants of Health     Financial Resource Strain: Not on file   Food Insecurity: Not on file   Transportation Needs: Not on file   Physical Activity: Not on file   Stress: Not on file   Social Connections: Not on file   Intimate Partner Violence: Not on file   Housing Stability: Not on file     History reviewed. No pertinent past  "medical history.  History reviewed. No pertinent surgical history.  No Known Allergies  Current Outpatient Medications on File Prior to Visit   Medication Sig Dispense Refill    naproxen (NAPROSYN) 500 mg tablet TAKE 1 TABLET BY MOUTH TWICE A DAY FOR 14 DAYS WITH FOOD -AS NEEDED FOR INFLAMMATION (Patient not taking: Reported on 1/3/2024)       No current facility-administered medications on file prior to visit.              Objective:        Ortho Exam    No change in exam.    I have personally reviewed pertinent films in PACS and my interpretation is MRI shows tendinosis of the biceps insertion and some bursitis in this area as well..    Portions of the record may have been created with voice recognition software.  Occasional wrong word or \"sound a like\" substitutions may have occurred due to the inherent limitations of voice recognition software.  Read the chart carefully and recognize, using context, where substitutions have occurred.  "

## 2024-07-19 ENCOUNTER — OFFICE VISIT (OUTPATIENT)
Dept: OBGYN CLINIC | Facility: CLINIC | Age: 24
End: 2024-07-19
Payer: COMMERCIAL

## 2024-07-19 VITALS — HEIGHT: 72 IN | WEIGHT: 203 LBS | BODY MASS INDEX: 27.5 KG/M2

## 2024-07-19 DIAGNOSIS — M75.21 BICEPS TENDINITIS OF RIGHT SHOULDER: Primary | ICD-10-CM

## 2024-07-19 PROCEDURE — 99204 OFFICE O/P NEW MOD 45 MIN: CPT | Performed by: PHYSICAL MEDICINE & REHABILITATION

## 2024-07-19 PROCEDURE — 20606 DRAIN/INJ JOINT/BURSA W/US: CPT | Performed by: PHYSICAL MEDICINE & REHABILITATION

## 2024-07-19 NOTE — PROGRESS NOTES
"1. Biceps tendinitis of right shoulder  Medium joint arthrocentesis: R elbow    Ambulatory referral to PT/OT hand therapy        Orders Placed This Encounter   Procedures    Medium joint arthrocentesis: R elbow    Ambulatory referral to PT/OT hand therapy        Impression:  This is a patient referred by Dr. Kennedy's team with right distal bicep tendinitis.  We discussed that platelet rich plasma injections are considered experimental and not covered by insurance.  We mutually decided to proceed.  The patient is a good candidate for USG PRP peritendon injection.  Please see procedure note below.  Patient tolerated the procedure.  He will avoid all anti-inflammatories for as long as possible.  After 2 weeks, he can gradually get back into all physical activity.  He is going away to [place] for [employer] school next week.     Imaging Studies (I personally reviewed images in PACS and report):  Right elbow MRI:  \"Subcutaneous Tissues: Intact.     Joint Effusion: None.     TENDONS  Biceps: Mild insertional tendinosis of the biceps tendon with adjacent mild bicipitoradial bursitis (series 4 image 21.) No tear.  Brachialis: Intact.  Triceps: Intact.  Common flexor: Intact.  Common extensor: Intact.     LIGAMENTS  Radial collateral ligament: Intact  Ulnar collateral ligament: Intact.  Lateral ulnar collateral ligament: Intact.  Annular ligament: Intact.     Cubital Tunnel/Ulnar Nerve: Intact.  Radial Nerve: Intact.     Articular Surfaces: Intact.  Bones: Intact.     Musculature: Intact.     IMPRESSION:     Mild insertional tendinosis of the biceps tendon with adjacent mild bicipitoradial bursitis (series 4 image 21.) No tear.\"    No follow-ups on file.    Patient is in agreement with the above plan.    HPI:  Ryan Small is a 24 y.o. male  who presents for evaluation of   Chief Complaint   Patient presents with    Right Elbow - Pain       History of present illness from referring clinician's notes reviewed.     Following " history reviewed and updated:  History reviewed. No pertinent past medical history.  History reviewed. No pertinent surgical history.  Social History   Social History     Substance and Sexual Activity   Alcohol Use Not Currently     Social History     Substance and Sexual Activity   Drug Use Not Currently     Social History     Tobacco Use   Smoking Status Never   Smokeless Tobacco Never     History reviewed. No pertinent family history.  No Known Allergies     Constitutional:  Ht 6' (1.829 m)   Wt 92.1 kg (203 lb)   BMI 27.53 kg/m²    General: NAD.  Eyes: Anicteric sclerae.  Neck: Supple.  Lungs: Unlabored breathing.  Cardiovascular: No lower extremity edema.  Skin: Intact without erythema.  Neurologic: Sensation intact to light touch.  Psychiatric: Mood and affect are appropriate.    Right Elbow Exam     Tenderness   The patient is experiencing no tenderness.     Range of Motion   The patient has normal right elbow ROM.    Other   Erythema: absent  Scars: absent  Sensation: normal  Pulse: present             Medium joint arthrocentesis: R elbow  Universal Protocol:  Consent: Verbal consent obtained. Written consent not obtained.  Risks and benefits: risks, benefits and alternatives were discussed  Consent given by: patient  Timeout called at: 7/19/2024 11:37 AM.  Patient understanding: patient states understanding of the procedure being performed  Site marked: the operative site was marked  Radiology Images displayed and confirmed. If images not available, report reviewed: imaging studies available  Patient identity confirmed: verbally with patient  Supporting Documentation  Indications: pain and diagnostic evaluation   Procedure Details  Location: elbow - R elbow  Needle size: 25 G  Ultrasound guidance: yes    Patient tolerance: patient tolerated the procedure well with no immediate complications  Dressing:  Sterile dressing applied    Venous blood draw was obtained from antecubital vein.  The blood sample was  spun in the centrifuge and platelet rich plasma was obtained.  Patient had an injection of platelet-rich-plasma into the right distal bicep peritendon.  A total of 4.5 ml's of PRP was obtained. The entirety of this PRP was easily infiltrated.

## 2024-07-23 ENCOUNTER — EVALUATION (OUTPATIENT)
Dept: PHYSICAL THERAPY | Facility: OTHER | Age: 24
End: 2024-07-23
Payer: COMMERCIAL

## 2024-07-23 DIAGNOSIS — M75.21 BICEPS TENDINITIS OF RIGHT SHOULDER: ICD-10-CM

## 2024-07-23 PROCEDURE — 97110 THERAPEUTIC EXERCISES: CPT

## 2024-07-23 PROCEDURE — 97161 PT EVAL LOW COMPLEX 20 MIN: CPT

## 2024-07-23 NOTE — PROGRESS NOTES
PT Evaluation     Today's date: 2024  Patient name: Ryan Small  : 2000  MRN: [id number]  Referring provider: Adriana Hays DO  Dx:   Encounter Diagnosis     ICD-10-CM    1. Biceps tendinitis of right shoulder  M75.21 Ambulatory referral to PT/OT hand therapy          Start Time: 1125  Stop Time: 1200  Total time in clinic (min): 35 minutes    Assessment  Impairments: abnormal coordination, abnormal muscle firing, abnormal muscle tone, abnormal or restricted ROM, abnormal movement, activity intolerance, impaired physical strength, lacks appropriate home exercise program, pain with function, participation limitations and activity limitations  Symptom irritability: moderate    Assessment details: Problem List:  1) R radial nerve hypomobility  2) Proximal force deficits MT/LT    Ryan Small is a pleasant 24 y.o. male who presents with right forearm pain that has been on/off the past year notably after heavier lifting in the gym. He presents upon exam with problem list/impairments noted above, mixed nociceptive/neuropathic pain, and diagnosis of biceps tendinopathy resulting in the pain he is experiencing, worry over not knowing what's wrong, wanting to avoid surgery, fear of not being able to keep active, and future ill health (and wanting to prevent it). No further referral appears necessary at this time based upon examination results. I expect he will improve in 2-3 months. Positive prognostic indicators include positive attitude toward recovery, good understanding of diagnosis and treatment plan options, absence of peripheralization, and absence of observed red flags. Negative prognostic indicators include chronicity of symptoms, multiple concurrent orthopedic problems, and multiple prior failed treatments. Patient was provided with a customized home exercise program to begin performing on their own. All patient questions and concerns have been addressed at this time. Thank you for the  "kind referral.    Comparable signs:  1) Pain after lifting biceps  Understanding of Dx/Px/POC: good     Prognosis: good    Goals  Short Term Goals:   1. Patient will be independent with a customized HEP.  2. Patient will report at least 40% improvement overall with gym lifts/activities.    Long Term Goals:   1. Patient will continue with HEP independence to allow for decreased future reoccurrence of pain and loss in function.  2. Patient will report at least 80% improvement overall with gym lifts/activities.    Plan  Patient would benefit from: PT eval and skilled physical therapy  Planned modality interventions: cryotherapy, TENS, thermotherapy: hydrocollator packs and low level laser therapy    Planned therapy interventions: manual therapy, massage, joint mobilization, coordination, graded exercise, graded activity, functional ROM exercises, therapeutic exercise, therapeutic activities, patient education, neuromuscular re-education, home exercise program, flexibility and strengthening    Frequency: 1x week  Duration in weeks: 1  Plan of Care beginning date: 2024  Plan of Care expiration date: 2024  Treatment plan discussed with: patient  Plan details: HEP and D/C - 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         Subjective Evaluation    History of Present Illness  Mechanism of injury: Patient is a 24 y.o. male presenting to physical therapy with chief complaint of right forearm pain most notably after heavy lifting. Patient reports this episode of pain/symptoms has been going on for the past year. Patient denies pain elsewhere and denies numbness/tingling.   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  Hand dominance: right  Exercise history: lifting, soccer, basketball    Treatments  Previous treatment: injection treatment and massage (PRP)        Objective    Palpation: No TTP noted this visit  Myotomes (L/R): Intact B/L UE  Dermatome: (pinprick- L/R): Intact B/L UE               MMT         AROM          PROM    Shoulder       L       R        L           R      L     R   Flex. 5 5 WNL WNL     Abd. 5 5 WNL WNL     IR. 5 5 WNL WNL     ER. 5 5 WNL WNL              MT 3+ 3+       LT 3+ 3+                Elbow         Flex 5 5 WNL WNL     Ext 5 5 WNL WNL     Sup 5 5 WNL WNL     Pron 5 5 WNL WNL              Wrist         Flex 5 5 WNL WNL     Ext 5 5 WNL WNL     RD 5 5 WNL WNL     UD 5 5 WNL WNL               120# 115#                               ULTT: all (-) for reproduction of symptoms, minor stretch with radial nerve        Precautions: As of 7/19/23 per Dr. Hays: \"He will avoid all anti-inflammatories for as long as possible. After 2 weeks, he can gradually get back into all physical activity. He is going away to [place] for [employer] next week.\"      HEP Access Code [email or web address]     POC expires Unit limit Auth Expiration date PT/OT + Visit Limit?   7/23/24 BOMN N/A 75 (1 used)                             Visit 1 IE            Manuals 7/23                                                                Neuro Re-Ed             Sup/Pron [employer]/ DB HEP            MT T 10x HEP            LT Y 10x HEP            Radial N. Glide 10x HEP                                                   Ther Ex             Thoracic Ext Foam Roll 10x10\" HEP            Education on future progressions while away at [facility]                                                                                          Ther Activity                                       Gait Training                                       Modalities                           "

## 2024-09-18 ENCOUNTER — TELEPHONE (OUTPATIENT)
Dept: OBGYN CLINIC | Facility: MEDICAL CENTER | Age: 24
End: 2024-09-18

## 2024-09-18 NOTE — TELEPHONE ENCOUNTER
Caller:  Ryan    Doctor: Dr Hays     Reason for call: Ryan is in [place] right now in school. He is calling due to Having done 7/19     Medium joint arthrocentesis: R elbow [[id number]]      He said at this point he is only 30 - 40 % better and is wondering how long it should take until it feels better?    Call back#: [phone]  Please call tomorrow ( thurs ) after 3:00 pm due to his classes.    Thank you

## 2024-09-19 NOTE — TELEPHONE ENCOUNTER
Caller: Patient    Doctor: Lis    Reason for call: Patient calling to check on the status of his message. Read patient message above. Verbalized understanding    Call back#: n/a